# Patient Record
Sex: FEMALE | Race: WHITE | NOT HISPANIC OR LATINO | URBAN - METROPOLITAN AREA
[De-identification: names, ages, dates, MRNs, and addresses within clinical notes are randomized per-mention and may not be internally consistent; named-entity substitution may affect disease eponyms.]

---

## 2017-02-20 ENCOUNTER — EMERGENCY (EMERGENCY)
Facility: HOSPITAL | Age: 57
LOS: 1 days | Discharge: HOME | End: 2017-02-20
Attending: EMERGENCY MEDICINE | Admitting: EMERGENCY MEDICINE
Payer: COMMERCIAL

## 2017-02-20 VITALS — DIASTOLIC BLOOD PRESSURE: 108 MMHG | SYSTOLIC BLOOD PRESSURE: 174 MMHG

## 2017-02-20 VITALS — HEIGHT: 65 IN | WEIGHT: 125 LBS

## 2017-02-20 DIAGNOSIS — Z98.89 OTHER SPECIFIED POSTPROCEDURAL STATES: Chronic | ICD-10-CM

## 2017-02-20 DIAGNOSIS — Z96.611 PRESENCE OF RIGHT ARTIFICIAL SHOULDER JOINT: Chronic | ICD-10-CM

## 2017-02-20 DIAGNOSIS — Z90.710 ACQUIRED ABSENCE OF BOTH CERVIX AND UTERUS: Chronic | ICD-10-CM

## 2017-02-20 DIAGNOSIS — Z95.1 PRESENCE OF AORTOCORONARY BYPASS GRAFT: Chronic | ICD-10-CM

## 2017-02-20 PROCEDURE — 29105 APPLICATION LONG ARM SPLINT: CPT

## 2017-02-20 PROCEDURE — 73080 X-RAY EXAM OF ELBOW: CPT | Mod: 26,RT

## 2017-02-20 PROCEDURE — 73060 X-RAY EXAM OF HUMERUS: CPT | Mod: 26,RT

## 2017-02-20 PROCEDURE — 73080 X-RAY EXAM OF ELBOW: CPT

## 2017-02-20 PROCEDURE — 29105 APPLICATION LONG ARM SPLINT: CPT | Mod: RT

## 2017-02-20 PROCEDURE — 99284 EMERGENCY DEPT VISIT MOD MDM: CPT | Mod: 25

## 2017-02-20 PROCEDURE — 99283 EMERGENCY DEPT VISIT LOW MDM: CPT | Mod: 25

## 2017-02-20 PROCEDURE — 73060 X-RAY EXAM OF HUMERUS: CPT

## 2017-02-20 NOTE — ED PROCEDURE NOTE - NS ED PERI NEURO NEG
Pre-application: Motor, sensory, and vascular responses intact in the injured extremity./Post-application: Motor, sensory, and vascular responses intact in the injured extremity.

## 2017-02-20 NOTE — ED PROCEDURE NOTE - NS ED PERI VASCULAR NEG
no swelling/fingers/toes warm to touch/capillary refill time < 2 seconds/no cyanosis of extremity/no paresthesia

## 2017-02-20 NOTE — ED STATDOCS - PROGRESS NOTE DETAILS
PA NOTE: Pt seen by intake physician and hpi/orders/plan reviewed. PT presenting to ED with complaints of right elbow pain x 1 week.  Patient states that she slipped on the ice, pt has been taking tylenol for pain - doesn't want anything else for pain at this time  PE: GEN: Awake, alert,  NAD,  EYES: PERRL CARDIAC: Reg rate and rhythm, S1,S2, RRR  RESP: No distress noted. Lungs CTA bilaterally no wheeze, ronchi, rales. ABD: soft,  non-tender, no guarding. . NEURO: AOx3, no focal deficits   Musculoskeletal: ecchymosis and tenderness over the proximal forearm, radial pulse intact, full ROM  PLAN: xray, sling and f/u with ortho pt noted to have high BP - currently asymptomatic - took her lopressor today - advised to f/u with PMD/cardiologist PA NOTE: Pt seen by intake physician and hpi/orders/plan reviewed. PT presenting to ED with complaints of right elbow pain x 1 week.  Patient states that she slipped on the ice, pt has been taking tylenol for pain - doesn't want anything else for pain at this time  PE: GEN: Awake, alert,  NAD,  EYES: PERRL CARDIAC: Reg rate and rhythm, S1,S2, RRR  RESP: No distress noted. Lungs CTA bilaterally no wheeze, ronchi, rales. ABD: soft,  non-tender, no guarding. . NEURO: AOx3, no focal deficits   Musculoskeletal: ecchymosis and tenderness over the proximal forearm, radial pulse intact, full ROM  PLAN: xray, splint and f/u with ortho

## 2017-02-20 NOTE — ED STATDOCS - PMH
Anemia    CAD (coronary artery disease)    Chronic back pain greater than 3 months duration    Depression    Heart attack    Herniated nucleus pulposus    High cholesterol    Hypertension    Hyperthyroidism    Meningitis    Meningitis    Migraine    Mitral regurgitation    Opiate abuse, continuous  4 years ago  Pulmonary hypertension    Tricuspid regurgitation

## 2017-02-20 NOTE — ED ADULT TRIAGE NOTE - CHIEF COMPLAINT QUOTE
Patient arrived to ED today with c/o right elbow pain.  Patient states she fell onto her arm at work 6 days ago.  Patient states she slipped on a patch of ice at her job while opening her car door and fell onto her right arm.

## 2017-02-20 NOTE — ED STATDOCS - CARE PLAN
Principal Discharge DX:	Pain of right upper extremity Principal Discharge DX:	Closed fracture of elbow, right, initial encounter

## 2017-02-20 NOTE — ED STATDOCS - NS ED MD SCRIBE ATTENDING SCRIBE SECTIONS
PHYSICAL EXAM/DISPOSITION/VITAL SIGNS( Pullset)/INTAKE ASSESSMENT/SCREENINGS/PAST MEDICAL/SURGICAL/SOCIAL HISTORY/HISTORY OF PRESENT ILLNESS/REVIEW OF SYSTEMS/HIV

## 2017-02-20 NOTE — ED STATDOCS - ATTENDING CONTRIBUTION TO CARE
I, Ross Stoll, performed the initial face to face bedside interview with this patient regarding history of present illness, review of symptoms and relevant past medical, social and family history.  I completed an independent physical examination.  I was the provider who initially evaluated this patient.  The history, relevant review of systems, past medical and surgical history, medical decision making, and physical examination was documented by the scribe in my presence and I attest to the accuracy of the documentation. Follow-up on ordered tests (ie labs, radiologic studies) and re-evaluation of the patient's status has been communicated to the ACP.  Disposition of the patient will be based on test outcome and response to ED interventions.

## 2017-02-20 NOTE — ED STATDOCS - PSH
History of cholecystectomy    S/P appendectomy    S/P CABG (coronary artery bypass graft)  2012  S/P  section    S/P hysterectomy    S/P shoulder replacement, right

## 2017-02-20 NOTE — ED STATDOCS - OBJECTIVE STATEMENT
56 year old female, with hx of MI and CAD, presenting to the ED with right elbow pain s/p slip and fall on ice 6 days. Pt states that she was getting her apron out of her car while at work when she slipped on ice and landed on her right elbow. Pt states that she is unable to fully range her right upper extremity and is unable to push up on her right upper extremity. She states that she has PSHx of right shoulder replacement in October 2016. Pt states that she is taking Tylenol for her pain. No further complaints at this time.  PMD: Dr. Lombardi  Orthopedist: Dr. Nieves

## 2017-02-20 NOTE — ED STATDOCS - MUSCULOSKELETAL, MLM
Tenderness to supracondylar area. No scapular tenderness and no clavicular tenderness. Range of motion from 10 degrees to 95 degrees. Able to supinate and pronate right arm.

## 2017-02-20 NOTE — ED ADULT NURSE NOTE - OBJECTIVE STATEMENT
Patient arrived to ED today with c/o right elbow pain.  Patient states she fell onto her arm at work 6 days ago.  Patient states she slipped on a patch of ice at her job while opening her car door and fell onto her right arm..

## 2018-05-10 ENCOUNTER — MEDICATION RENEWAL (OUTPATIENT)
Age: 58
End: 2018-05-10

## 2018-06-05 ENCOUNTER — MEDICATION RENEWAL (OUTPATIENT)
Age: 58
End: 2018-06-05

## 2018-09-02 ENCOUNTER — EMERGENCY (EMERGENCY)
Facility: HOSPITAL | Age: 58
LOS: 1 days | Discharge: HOME | End: 2018-09-02
Attending: EMERGENCY MEDICINE
Payer: COMMERCIAL

## 2018-09-02 VITALS
HEIGHT: 65 IN | HEART RATE: 80 BPM | WEIGHT: 126.1 LBS | DIASTOLIC BLOOD PRESSURE: 86 MMHG | OXYGEN SATURATION: 100 % | RESPIRATION RATE: 18 BRPM | TEMPERATURE: 98 F | SYSTOLIC BLOOD PRESSURE: 136 MMHG

## 2018-09-02 DIAGNOSIS — Z96.611 PRESENCE OF RIGHT ARTIFICIAL SHOULDER JOINT: Chronic | ICD-10-CM

## 2018-09-02 DIAGNOSIS — Z90.710 ACQUIRED ABSENCE OF BOTH CERVIX AND UTERUS: Chronic | ICD-10-CM

## 2018-09-02 DIAGNOSIS — Z98.89 OTHER SPECIFIED POSTPROCEDURAL STATES: Chronic | ICD-10-CM

## 2018-09-02 DIAGNOSIS — Z95.1 PRESENCE OF AORTOCORONARY BYPASS GRAFT: Chronic | ICD-10-CM

## 2018-09-02 PROCEDURE — 99283 EMERGENCY DEPT VISIT LOW MDM: CPT

## 2018-09-02 RX ORDER — METHOCARBAMOL 500 MG/1
2 TABLET, FILM COATED ORAL
Qty: 30 | Refills: 0
Start: 2018-09-02 | End: 2018-09-06

## 2018-09-02 RX ORDER — METHOCARBAMOL 500 MG/1
1500 TABLET, FILM COATED ORAL ONCE
Qty: 0 | Refills: 0 | Status: COMPLETED | OUTPATIENT
Start: 2018-09-02 | End: 2018-09-02

## 2018-09-02 RX ADMIN — METHOCARBAMOL 1500 MILLIGRAM(S): 500 TABLET, FILM COATED ORAL at 15:42

## 2018-09-02 NOTE — ED PROVIDER NOTE - OBJECTIVE STATEMENT
59 yo F presented to ED with c/o left sided neck pain s/p injury 10 days ago. pt states that she walked into a coworkers shoulder accidently 10 days ago and has some facial pain, but resolved, however, pt now with left sided neck pain x couple days. pt taking Motrin and heat with some mild relief of pain. NO weakness or paresthesias. No HA or midline neck pain.

## 2018-09-02 NOTE — ED ADULT NURSE NOTE - NSIMPLEMENTINTERV_GEN_ALL_ED
Implemented All Universal Safety Interventions:  [place] to call system. Call bell, personal items and telephone within reach. Instruct patient to call for assistance. Room bathroom lighting operational. Non-slip footwear when patient is off stretcher. Physically safe environment: no spills, clutter or unnecessary equipment. Stretcher in lowest position, wheels locked, appropriate side rails in place.

## 2018-09-02 NOTE — ED PROVIDER NOTE - ATTENDING CONTRIBUTION TO CARE
I, Rojelio Colon, performed a face to face bedside interview with this patient regarding history of present illness, review of symptoms and relevant past medical, social and family history.  I completed an independent physical examination. I have communicated the patient’s plan of care and disposition with the ACP.  58 year old female presents with left sided neck pain x 10 days after wok place injury. No numbness, tingling, weakness, HA  Gen: NAD, well appearing  CV: RRR  Pul: CTA b/l  Neuro: no focal deficits  msk: no midline spinal pain, paraspinal tenderness  Sx consistwent with neck muscle spasm, neuro intact, feels better, stable for dc

## 2018-09-02 NOTE — ED ADULT NURSE NOTE - CHPI ED NUR SYMPTOMS NEG
no weakness/no paranoia/no hallucinations/no disorientation/no agitation/no change in level of consciousness/no weight loss/no fever/no suicidal

## 2018-09-04 ENCOUNTER — EMERGENCY (EMERGENCY)
Facility: HOSPITAL | Age: 58
LOS: 1 days | Discharge: HOME | End: 2018-09-04
Attending: EMERGENCY MEDICINE
Payer: COMMERCIAL

## 2018-09-04 VITALS
HEART RATE: 69 BPM | SYSTOLIC BLOOD PRESSURE: 132 MMHG | DIASTOLIC BLOOD PRESSURE: 80 MMHG | OXYGEN SATURATION: 99 % | RESPIRATION RATE: 17 BRPM | TEMPERATURE: 98 F

## 2018-09-04 VITALS — HEIGHT: 65 IN | WEIGHT: 126.1 LBS

## 2018-09-04 DIAGNOSIS — Z98.89 OTHER SPECIFIED POSTPROCEDURAL STATES: Chronic | ICD-10-CM

## 2018-09-04 DIAGNOSIS — Z95.1 PRESENCE OF AORTOCORONARY BYPASS GRAFT: Chronic | ICD-10-CM

## 2018-09-04 DIAGNOSIS — Z96.611 PRESENCE OF RIGHT ARTIFICIAL SHOULDER JOINT: Chronic | ICD-10-CM

## 2018-09-04 DIAGNOSIS — Z90.710 ACQUIRED ABSENCE OF BOTH CERVIX AND UTERUS: Chronic | ICD-10-CM

## 2018-09-04 LAB
ALBUMIN SERPL ELPH-MCNC: 4.1 G/DL — SIGNIFICANT CHANGE UP (ref 3.3–5.2)
ALP SERPL-CCNC: 118 U/L — SIGNIFICANT CHANGE UP (ref 40–120)
ALT FLD-CCNC: 74 U/L — HIGH
ANION GAP SERPL CALC-SCNC: 13 MMOL/L — SIGNIFICANT CHANGE UP (ref 5–17)
APTT BLD: 23.1 SEC — LOW (ref 27.5–37.4)
AST SERPL-CCNC: 37 U/L — HIGH
BASOPHILS # BLD AUTO: 0 K/UL — SIGNIFICANT CHANGE UP (ref 0–0.2)
BASOPHILS NFR BLD AUTO: 0.8 % — SIGNIFICANT CHANGE UP (ref 0–2)
BILIRUB SERPL-MCNC: <0.2 MG/DL — LOW (ref 0.4–2)
BUN SERPL-MCNC: 15 MG/DL — SIGNIFICANT CHANGE UP (ref 8–20)
CALCIUM SERPL-MCNC: 8.9 MG/DL — SIGNIFICANT CHANGE UP (ref 8.6–10.2)
CHLORIDE SERPL-SCNC: 103 MMOL/L — SIGNIFICANT CHANGE UP (ref 98–107)
CO2 SERPL-SCNC: 24 MMOL/L — SIGNIFICANT CHANGE UP (ref 22–29)
CREAT SERPL-MCNC: 0.54 MG/DL — SIGNIFICANT CHANGE UP (ref 0.5–1.3)
EOSINOPHIL # BLD AUTO: 0.2 K/UL — SIGNIFICANT CHANGE UP (ref 0–0.5)
EOSINOPHIL NFR BLD AUTO: 4.1 % — SIGNIFICANT CHANGE UP (ref 0–6)
GLUCOSE SERPL-MCNC: 90 MG/DL — SIGNIFICANT CHANGE UP (ref 70–115)
HCT VFR BLD CALC: 35.2 % — LOW (ref 37–47)
HGB BLD-MCNC: 11.3 G/DL — LOW (ref 12–16)
INR BLD: 0.91 RATIO — SIGNIFICANT CHANGE UP (ref 0.88–1.16)
LYMPHOCYTES # BLD AUTO: 1.4 K/UL — SIGNIFICANT CHANGE UP (ref 1–4.8)
LYMPHOCYTES # BLD AUTO: 27.4 % — SIGNIFICANT CHANGE UP (ref 20–55)
MCHC RBC-ENTMCNC: 29 PG — SIGNIFICANT CHANGE UP (ref 27–31)
MCHC RBC-ENTMCNC: 32.1 G/DL — SIGNIFICANT CHANGE UP (ref 32–36)
MCV RBC AUTO: 90.3 FL — SIGNIFICANT CHANGE UP (ref 81–99)
MONOCYTES # BLD AUTO: 0.4 K/UL — SIGNIFICANT CHANGE UP (ref 0–0.8)
MONOCYTES NFR BLD AUTO: 8 % — SIGNIFICANT CHANGE UP (ref 3–10)
NEUTROPHILS # BLD AUTO: 3.1 K/UL — SIGNIFICANT CHANGE UP (ref 1.8–8)
NEUTROPHILS NFR BLD AUTO: 59.7 % — SIGNIFICANT CHANGE UP (ref 37–73)
PLATELET # BLD AUTO: 233 K/UL — SIGNIFICANT CHANGE UP (ref 150–400)
POTASSIUM SERPL-MCNC: 4.1 MMOL/L — SIGNIFICANT CHANGE UP (ref 3.5–5.3)
POTASSIUM SERPL-SCNC: 4.1 MMOL/L — SIGNIFICANT CHANGE UP (ref 3.5–5.3)
PROT SERPL-MCNC: 6.3 G/DL — LOW (ref 6.6–8.7)
PROTHROM AB SERPL-ACNC: 10 SEC — SIGNIFICANT CHANGE UP (ref 9.8–12.7)
RBC # BLD: 3.9 M/UL — LOW (ref 4.4–5.2)
RBC # FLD: 13.1 % — SIGNIFICANT CHANGE UP (ref 11–15.6)
SODIUM SERPL-SCNC: 140 MMOL/L — SIGNIFICANT CHANGE UP (ref 135–145)
TROPONIN T SERPL-MCNC: <0.01 NG/ML — SIGNIFICANT CHANGE UP (ref 0–0.06)
TROPONIN T SERPL-MCNC: <0.01 NG/ML — SIGNIFICANT CHANGE UP (ref 0–0.06)
WBC # BLD: 5.2 K/UL — SIGNIFICANT CHANGE UP (ref 4.8–10.8)
WBC # FLD AUTO: 5.2 K/UL — SIGNIFICANT CHANGE UP (ref 4.8–10.8)

## 2018-09-04 PROCEDURE — 99284 EMERGENCY DEPT VISIT MOD MDM: CPT

## 2018-09-04 PROCEDURE — 80053 COMPREHEN METABOLIC PANEL: CPT

## 2018-09-04 PROCEDURE — 85730 THROMBOPLASTIN TIME PARTIAL: CPT

## 2018-09-04 PROCEDURE — 85027 COMPLETE CBC AUTOMATED: CPT

## 2018-09-04 PROCEDURE — 93005 ELECTROCARDIOGRAM TRACING: CPT

## 2018-09-04 PROCEDURE — 93010 ELECTROCARDIOGRAM REPORT: CPT

## 2018-09-04 PROCEDURE — 36415 COLL VENOUS BLD VENIPUNCTURE: CPT

## 2018-09-04 PROCEDURE — 71045 X-RAY EXAM CHEST 1 VIEW: CPT | Mod: 26

## 2018-09-04 PROCEDURE — 84484 ASSAY OF TROPONIN QUANT: CPT

## 2018-09-04 PROCEDURE — 82550 ASSAY OF CK (CPK): CPT

## 2018-09-04 PROCEDURE — 85610 PROTHROMBIN TIME: CPT

## 2018-09-04 PROCEDURE — 71045 X-RAY EXAM CHEST 1 VIEW: CPT

## 2018-09-04 PROCEDURE — 99283 EMERGENCY DEPT VISIT LOW MDM: CPT | Mod: 25

## 2018-09-04 RX ORDER — ACETAMINOPHEN 500 MG
650 TABLET ORAL ONCE
Qty: 0 | Refills: 0 | Status: COMPLETED | OUTPATIENT
Start: 2018-09-04 | End: 2018-09-04

## 2018-09-04 RX ADMIN — Medication 650 MILLIGRAM(S): at 15:48

## 2018-09-04 NOTE — ED PROVIDER NOTE - OBJECTIVE STATEMENT
57 yo female with PMH of CAD s/p CABG 5 years ago, HTN, Mitral and Tricuspid regurg presents after a syncopal episode. Patient was at work as a  at [employer], when she suddenly felt faint and passed out. She was caught by one of her coworkers and lowered to the floor. She states she was out for about 5 seconds and after felt clammy, sweaty, nauseous, had a HA and her heart was beating very fast with palpitations. She thought she might have had a heart attack so she took a baby aspirin. She states this has never happened to her before.  Patient states her son passed away 8 years ago to this day.  She currently complains of a 5/10, global, achy headache. She denies any dizziness, lightheadedness, CP, Abd pain, N/V/D, fever, recent illnesses or sick contacts.

## 2018-09-04 NOTE — ED ADULT NURSE NOTE - OBJECTIVE STATEMENT
Pt reports having syncopal episode at work. Pt did not hit her head, a coworker lowered her to the floor. Pt states that when she woke up she had chest pressure, pain and was sweating. Those symptoms have since resolved. Pt states that she had an MI in the past and that her symptoms were similar.

## 2018-09-04 NOTE — ED ADULT NURSE REASSESSMENT NOTE - NS ED NURSE REASSESS COMMENT FT1
Pt with improvement in symptoms. Pt reports a lot of stressors in her life during this time of the year. Will follow up with cardiology as directed.

## 2018-09-04 NOTE — ED STATDOCS - PROGRESS NOTE DETAILS
57 y/o F pt with hx fo CAD, HTN, mitral and tricuspid regurgitation s/p CABG, presents to ED c/o lightheadedness and syncope today. Pt states she was standing at register at work when she felt a head rush. She also felt palpitations and left arm pain. Former smoker. Pt took aspirin and came to ED. Pt will be placed in the main ED for complete evaluation by another provider.

## 2018-09-04 NOTE — ED PROVIDER NOTE - MEDICAL DECISION MAKING DETAILS
Syncope with possible Syncope of unknown etiology, possible psychosocial component due to anniversary of her son's passing. Will evaluate for metabolic and cardiac issues and monitor.

## 2018-09-04 NOTE — ED ADULT TRIAGE NOTE - CHIEF COMPLAINT QUOTE
'I fainted a little while ago and when I woke up my heart was racing. I had open heart surgery and I am afraid I might have a heart attack" Pt A & OX4. respirations even & unlabored. Ricco is cardiologist.

## 2018-09-04 NOTE — ED ADULT NURSE NOTE - NSIMPLEMENTINTERV_GEN_ALL_ED
Implemented All Universal Safety Interventions:  [employer] to call system. Call bell, personal items and telephone within reach. Instruct patient to call for assistance. Room bathroom lighting operational. Non-slip footwear when patient is off stretcher. Physically safe environment: no spills, clutter or unnecessary equipment. Stretcher in lowest position, wheels locked, appropriate side rails in place.

## 2018-09-04 NOTE — ED PROVIDER NOTE - ATTENDING CONTRIBUTION TO CARE
SEEN WITH RESIDENT DR MILLS  SYNCOPE AT WORK FOLLOWED BY SOB CHEST PAIN AND PALPITATIONS. IMPROVED NOW. FIRST EKG AND TROP NORMAL  DISCUSSED WITH DR ANTHONY WHO REC TWO SETS AND DISPO  AGREE WITH HX PE TX DISPO

## 2018-09-27 ENCOUNTER — APPOINTMENT (OUTPATIENT)
Dept: CARDIOLOGY | Facility: CLINIC | Age: 58
End: 2018-09-27
Payer: COMMERCIAL

## 2018-09-27 PROCEDURE — 78452 HT MUSCLE IMAGE SPECT MULT: CPT

## 2018-09-27 PROCEDURE — 93015 CV STRESS TEST SUPVJ I&R: CPT

## 2018-09-27 PROCEDURE — A9500: CPT

## 2018-10-01 RX ORDER — KIT FOR THE PREPARATION OF TECHNETIUM TC99M SESTAMIBI 1 MG/5ML
INJECTION, POWDER, LYOPHILIZED, FOR SOLUTION PARENTERAL
Refills: 0 | Status: COMPLETED | OUTPATIENT
Start: 2018-10-01

## 2018-10-01 RX ADMIN — KIT FOR THE PREPARATION OF TECHNETIUM TC99M SESTAMIBI 0: 1 INJECTION, POWDER, LYOPHILIZED, FOR SOLUTION PARENTERAL at 00:00

## 2019-04-24 ENCOUNTER — APPOINTMENT (OUTPATIENT)
Dept: FAMILY MEDICINE | Facility: CLINIC | Age: 59
End: 2019-04-24
Payer: COMMERCIAL

## 2019-04-24 VITALS
WEIGHT: 126 LBS | TEMPERATURE: 98.8 F | DIASTOLIC BLOOD PRESSURE: 80 MMHG | HEART RATE: 81 BPM | BODY MASS INDEX: 20.99 KG/M2 | HEIGHT: 65 IN | SYSTOLIC BLOOD PRESSURE: 139 MMHG | OXYGEN SATURATION: 96 %

## 2019-04-24 PROCEDURE — 99214 OFFICE O/P EST MOD 30 MIN: CPT

## 2019-04-24 NOTE — HISTORY OF PRESENT ILLNESS
[FreeTextEntry8] : Patient c/o cough and sore throat x 2 days  She has a lot of sinus congestion and a sinus headache. No relief w/OTC medications\par Pt also c/o pain in her right foot. Pain is intermittent and resolves on it's own. Stands on her feet for long periods of time at work.

## 2019-04-24 NOTE — PHYSICAL EXAM
[No Acute Distress] : no acute distress [Well Nourished] : well nourished [Well Developed] : well developed [Normal Sclera/Conjunctiva] : normal sclera/conjunctiva [Normal Outer Ear/Nose] : the outer ears and nose were normal in appearance [PERRL] : pupils equal round and reactive to light [Normal Oropharynx] : the oropharynx was normal [Supple] : supple [No Respiratory Distress] : no respiratory distress  [Clear to Auscultation] : lungs were clear to auscultation bilaterally [Normal Rate] : normal rate  [Regular Rhythm] : with a regular rhythm [Normal S1, S2] : normal S1 and S2 [Grossly Normal Strength/Tone] : grossly normal strength/tone [Normal Gait] : normal gait [Coordination Grossly Intact] : coordination grossly intact [Normal Insight/Judgement] : insight and judgment were intact [Normal Affect] : the affect was normal

## 2019-04-24 NOTE — PLAN
[FreeTextEntry1] : Advised to continue medications as directed. Life style and diet modifications were reviewed.\par Patient will begin antibiotic therapy as discussed. Supportive care was reviewed.\par Patient will try Tylenol for foot pain

## 2019-04-24 NOTE — REVIEW OF SYSTEMS
[Fatigue] : fatigue [Nasal Discharge] : nasal discharge [Postnasal Drip] : postnasal drip [Sore Throat] : sore throat [Cough] : cough [Negative] : Psychiatric

## 2019-04-24 NOTE — COUNSELING
[Healthy eating counseling provided] : healthy eating [Weight management counseling provided] : Weight management [Behavioral health counseling provided] : behavioral health

## 2019-04-26 ENCOUNTER — OTHER (OUTPATIENT)
Age: 59
End: 2019-04-26

## 2019-04-30 ENCOUNTER — APPOINTMENT (OUTPATIENT)
Dept: FAMILY MEDICINE | Facility: CLINIC | Age: 59
End: 2019-04-30
Payer: COMMERCIAL

## 2019-04-30 VITALS
BODY MASS INDEX: 19.77 KG/M2 | RESPIRATION RATE: 24 BRPM | TEMPERATURE: 98.2 F | DIASTOLIC BLOOD PRESSURE: 100 MMHG | HEART RATE: 83 BPM | HEIGHT: 66 IN | OXYGEN SATURATION: 99 % | WEIGHT: 123 LBS | SYSTOLIC BLOOD PRESSURE: 148 MMHG

## 2019-04-30 DIAGNOSIS — M85.80 OTHER SPECIFIED DISORDERS OF BONE DENSITY AND STRUCTURE, UNSPECIFIED SITE: ICD-10-CM

## 2019-04-30 DIAGNOSIS — Z87.898 PERSONAL HISTORY OF OTHER SPECIFIED CONDITIONS: ICD-10-CM

## 2019-04-30 PROCEDURE — 99214 OFFICE O/P EST MOD 30 MIN: CPT

## 2019-04-30 RX ORDER — ATORVASTATIN CALCIUM 10 MG/1
10 TABLET, FILM COATED ORAL DAILY
Qty: 90 | Refills: 1 | Status: DISCONTINUED | COMMUNITY
Start: 2018-06-05 | End: 2019-04-30

## 2019-04-30 NOTE — PLAN
[FreeTextEntry1] : Advised to continue medications as directed. Life style and diet modifications were discussed.\par Patient given requisition to get CXR.

## 2019-04-30 NOTE — HISTORY OF PRESENT ILLNESS
[FreeTextEntry8] : Patient complains dry cough is persistent . Wheezing noted, no fever since 04/27/2019. Patient states it is hard to take full breaths without coughing. Fatigue,myalgia and head ache. Diet is good and drinking water daily. Compliant with medications

## 2019-04-30 NOTE — PHYSICAL EXAM
[No Acute Distress] : no acute distress [Normal Sclera/Conjunctiva] : normal sclera/conjunctiva [Normal Outer Ear/Nose] : the outer ears and nose were normal in appearance [Supple] : supple [No Respiratory Distress] : no respiratory distress  [Normal Rate] : normal rate  [Normal Anterior Cervical Nodes] : no anterior cervical lymphadenopathy [Grossly Normal Strength/Tone] : grossly normal strength/tone [Normal Gait] : normal gait [Coordination Grossly Intact] : coordination grossly intact [Normal Affect] : the affect was normal [Normal Insight/Judgement] : insight and judgment were intact [de-identified] : Bilateral wheeze

## 2019-05-03 ENCOUNTER — APPOINTMENT (OUTPATIENT)
Dept: FAMILY MEDICINE | Facility: CLINIC | Age: 59
End: 2019-05-03
Payer: COMMERCIAL

## 2019-05-03 VITALS
HEART RATE: 87 BPM | BODY MASS INDEX: 19.29 KG/M2 | DIASTOLIC BLOOD PRESSURE: 96 MMHG | TEMPERATURE: 98.7 F | OXYGEN SATURATION: 99 % | WEIGHT: 120 LBS | HEIGHT: 66 IN | SYSTOLIC BLOOD PRESSURE: 142 MMHG

## 2019-05-03 DIAGNOSIS — Z83.79 FAMILY HISTORY OF OTHER DISEASES OF THE DIGESTIVE SYSTEM: ICD-10-CM

## 2019-05-03 DIAGNOSIS — Z87.09 PERSONAL HISTORY OF OTHER DISEASES OF THE RESPIRATORY SYSTEM: ICD-10-CM

## 2019-05-03 DIAGNOSIS — Z83.438 FAMILY HISTORY OF OTHER DISORDER OF LIPOPROTEIN METABOLISM AND OTHER LIPIDEMIA: ICD-10-CM

## 2019-05-03 DIAGNOSIS — Z80.1 FAMILY HISTORY OF MALIGNANT NEOPLASM OF TRACHEA, BRONCHUS AND LUNG: ICD-10-CM

## 2019-05-03 PROCEDURE — 99214 OFFICE O/P EST MOD 30 MIN: CPT

## 2019-05-03 NOTE — PHYSICAL EXAM
[Well Nourished] : well nourished [No Acute Distress] : no acute distress [Normal Sclera/Conjunctiva] : normal sclera/conjunctiva [PERRL] : pupils equal round and reactive to light [Normal Oropharynx] : the oropharynx was normal [Normal Outer Ear/Nose] : the outer ears and nose were normal in appearance [Supple] : supple [Normal Rate] : normal rate  [Clear to Auscultation] : lungs were clear to auscultation bilaterally [Regular Rhythm] : with a regular rhythm [Normal Anterior Cervical Nodes] : no anterior cervical lymphadenopathy [Normal S1, S2] : normal S1 and S2 [Grossly Normal Strength/Tone] : grossly normal strength/tone [Normal Gait] : normal gait [Coordination Grossly Intact] : coordination grossly intact [Normal Affect] : the affect was normal [Normal Insight/Judgement] : insight and judgment were intact [de-identified] : Bilateral rales

## 2019-05-03 NOTE — COUNSELING
[Weight management counseling provided] : Weight management [Healthy eating counseling provided] : healthy eating [Behavioral health counseling provided] : behavioral health  [Activity counseling provided] : activity [None] : None

## 2019-05-03 NOTE — HISTORY OF PRESENT ILLNESS
[FreeTextEntry1] : Pt here to follow up pneumonia. Pt states she feels some improvement but feels sluggish. Still has cough but is sleeping better.\par Diet is good and drinking water daily. Compliant w/medication

## 2019-05-03 NOTE — REVIEW OF SYSTEMS
[Fever] : fever [Fatigue] : fatigue [Postnasal Drip] : postnasal drip [Nasal Discharge] : nasal discharge [Cough] : cough [Muscle Pain] : muscle pain [Negative] : Heme/Lymph

## 2019-05-10 ENCOUNTER — APPOINTMENT (OUTPATIENT)
Dept: FAMILY MEDICINE | Facility: CLINIC | Age: 59
End: 2019-05-10
Payer: COMMERCIAL

## 2019-05-10 ENCOUNTER — NON-APPOINTMENT (OUTPATIENT)
Age: 59
End: 2019-05-10

## 2019-05-10 VITALS
WEIGHT: 123 LBS | SYSTOLIC BLOOD PRESSURE: 144 MMHG | BODY MASS INDEX: 19.77 KG/M2 | DIASTOLIC BLOOD PRESSURE: 88 MMHG | OXYGEN SATURATION: 97 % | HEIGHT: 66 IN | HEART RATE: 84 BPM

## 2019-05-10 DIAGNOSIS — Z87.09 PERSONAL HISTORY OF OTHER DISEASES OF THE RESPIRATORY SYSTEM: ICD-10-CM

## 2019-05-10 DIAGNOSIS — Z13.220 ENCOUNTER FOR SCREENING FOR LIPOID DISORDERS: ICD-10-CM

## 2019-05-10 DIAGNOSIS — J06.9 ACUTE UPPER RESPIRATORY INFECTION, UNSPECIFIED: ICD-10-CM

## 2019-05-10 DIAGNOSIS — R50.9 FEVER, UNSPECIFIED: ICD-10-CM

## 2019-05-10 PROCEDURE — 36415 COLL VENOUS BLD VENIPUNCTURE: CPT

## 2019-05-10 PROCEDURE — 93000 ELECTROCARDIOGRAM COMPLETE: CPT

## 2019-05-10 PROCEDURE — 99214 OFFICE O/P EST MOD 30 MIN: CPT | Mod: 25

## 2019-05-10 RX ORDER — BENZONATATE 100 MG/1
100 CAPSULE ORAL 3 TIMES DAILY
Qty: 30 | Refills: 0 | Status: COMPLETED | COMMUNITY
Start: 2019-04-26 | End: 2019-05-10

## 2019-05-10 RX ORDER — AMOXICILLIN AND CLAVULANATE POTASSIUM 875; 125 MG/1; MG/1
875-125 TABLET, COATED ORAL
Qty: 20 | Refills: 0 | Status: DISCONTINUED | COMMUNITY
Start: 2019-04-24 | End: 2019-05-10

## 2019-05-10 NOTE — PHYSICAL EXAM
[Well Developed] : well developed [Well Nourished] : well nourished [No Acute Distress] : no acute distress [PERRL] : pupils equal round and reactive to light [Normal Sclera/Conjunctiva] : normal sclera/conjunctiva [EOMI] : extraocular movements intact [Normal Oropharynx] : the oropharynx was normal [Normal Outer Ear/Nose] : the outer ears and nose were normal in appearance [Clear to Auscultation] : lungs were clear to auscultation bilaterally [No Respiratory Distress] : no respiratory distress  [Supple] : supple [Normal Rate] : normal rate  [Regular Rhythm] : with a regular rhythm [Normal Anterior Cervical Nodes] : no anterior cervical lymphadenopathy [Normal S1, S2] : normal S1 and S2 [Normal Gait] : normal gait [Grossly Normal Strength/Tone] : grossly normal strength/tone [Coordination Grossly Intact] : coordination grossly intact [Normal Insight/Judgement] : insight and judgment were intact

## 2019-05-10 NOTE — PLAN
[FreeTextEntry1] : Advised to restart Metoprolol. Will follow up w/cardiologist. Stated she had a stress test w/no pertinent findings

## 2019-05-10 NOTE — HISTORY OF PRESENT ILLNESS
[FreeTextEntry1] : Pt here for follow up pneumonia. Pt still coughing but cough is getting better. Pt needs Metoprolol renewal she has been out for 1-2 months. She is under care of cardiologist and needs to finish evaluation and Echo.\par \par  [de-identified] : Diet is good and drinking water daily.

## 2019-07-29 ENCOUNTER — CLINICAL ADVICE (OUTPATIENT)
Age: 59
End: 2019-07-29

## 2019-07-29 DIAGNOSIS — E55.9 VITAMIN D DEFICIENCY, UNSPECIFIED: ICD-10-CM

## 2019-08-02 ENCOUNTER — RESULT REVIEW (OUTPATIENT)
Age: 59
End: 2019-08-02

## 2019-08-06 ENCOUNTER — APPOINTMENT (OUTPATIENT)
Dept: FAMILY MEDICINE | Facility: CLINIC | Age: 59
End: 2019-08-06

## 2019-08-15 ENCOUNTER — APPOINTMENT (OUTPATIENT)
Dept: FAMILY MEDICINE | Facility: CLINIC | Age: 59
End: 2019-08-15
Payer: COMMERCIAL

## 2019-08-15 ENCOUNTER — NON-APPOINTMENT (OUTPATIENT)
Age: 59
End: 2019-08-15

## 2019-08-15 VITALS
DIASTOLIC BLOOD PRESSURE: 64 MMHG | HEIGHT: 66 IN | SYSTOLIC BLOOD PRESSURE: 92 MMHG | BODY MASS INDEX: 21.38 KG/M2 | TEMPERATURE: 98.8 F | OXYGEN SATURATION: 97 % | HEART RATE: 61 BPM | RESPIRATION RATE: 20 BRPM | WEIGHT: 133 LBS

## 2019-08-15 DIAGNOSIS — G62.9 POLYNEUROPATHY, UNSPECIFIED: ICD-10-CM

## 2019-08-15 DIAGNOSIS — Z87.01 PERSONAL HISTORY OF PNEUMONIA (RECURRENT): ICD-10-CM

## 2019-08-15 DIAGNOSIS — R60.0 LOCALIZED EDEMA: ICD-10-CM

## 2019-08-15 DIAGNOSIS — R05 COUGH: ICD-10-CM

## 2019-08-15 PROCEDURE — 96372 THER/PROPH/DIAG INJ SC/IM: CPT

## 2019-08-15 PROCEDURE — 99214 OFFICE O/P EST MOD 30 MIN: CPT | Mod: 25

## 2019-08-15 RX ORDER — CYANOCOBALAMIN 1000 UG/ML
1000 INJECTION INTRAMUSCULAR; SUBCUTANEOUS
Qty: 0 | Refills: 0 | Status: COMPLETED | OUTPATIENT
Start: 2019-08-15

## 2019-08-15 RX ORDER — CIPROFLOXACIN HYDROCHLORIDE 500 MG/1
500 TABLET, FILM COATED ORAL
Qty: 14 | Refills: 0 | Status: COMPLETED | COMMUNITY
Start: 2019-05-03 | End: 2019-08-15

## 2019-08-15 RX ADMIN — CYANOCOBALAMIN 1 MCG/ML: 1000 INJECTION INTRAMUSCULAR; SUBCUTANEOUS at 00:00

## 2019-08-15 NOTE — COUNSELING
[Behavioral health counseling provided] : Behavioral health counseling provided [Sleep ___ hours/day] : Sleep [unfilled] hours/day [Engage in a relaxing activity] : Engage in a relaxing activity [AUDIT-C Screening administered and reviewed] : AUDIT-C Screening administered and reviewed

## 2019-08-15 NOTE — PHYSICAL EXAM
[No Acute Distress] : no acute distress [Well Nourished] : well nourished [Well Developed] : well developed [Normal Sclera/Conjunctiva] : normal sclera/conjunctiva [PERRL] : pupils equal round and reactive to light [Normal Oropharynx] : the oropharynx was normal [Normal Outer Ear/Nose] : the outer ears and nose were normal in appearance [No Respiratory Distress] : no respiratory distress  [Normal Rate] : normal rate  [Clear to Auscultation] : lungs were clear to auscultation bilaterally [Regular Rhythm] : with a regular rhythm [Normal Anterior Cervical Nodes] : no anterior cervical lymphadenopathy [Normal S1, S2] : normal S1 and S2 [Grossly Normal Strength/Tone] : grossly normal strength/tone [Coordination Grossly Intact] : coordination grossly intact [Normal Insight/Judgement] : insight and judgment were intact [Normal Affect] : the affect was normal [Normal Gait] : normal gait

## 2019-08-16 NOTE — HEALTH RISK ASSESSMENT
[] : No [No] : In the past 12 months have you used drugs other than those required for medical reasons? No [No falls in past year] : Patient reported no falls in the past year

## 2019-08-16 NOTE — HISTORY OF PRESENT ILLNESS
[FreeTextEntry1] : Patient is here to review [facility] labs from 08/07/2019. Patient complains of a sensation of heaviness in both arms and upper body x one month intermittent x 4-5 times. Bilateral lower extremity edema left worse than right.Started more than a week ago. Denies recent travel,change in diet . Only eats meals made from home. [de-identified] : Has Cardiologist appointment on 9/10/19. Has not finished Cardiac evaluation.

## 2019-08-16 NOTE — PLAN
[FreeTextEntry1] : Advised to continue medication as directed. Life style and diet modifications were discussed.\par Requisition for carotid Doppler and Echo were provided to the patient

## 2019-09-10 ENCOUNTER — NON-APPOINTMENT (OUTPATIENT)
Age: 59
End: 2019-09-10

## 2019-09-10 ENCOUNTER — APPOINTMENT (OUTPATIENT)
Dept: CARDIOLOGY | Facility: CLINIC | Age: 59
End: 2019-09-10
Payer: COMMERCIAL

## 2019-09-10 VITALS
OXYGEN SATURATION: 100 % | DIASTOLIC BLOOD PRESSURE: 82 MMHG | HEART RATE: 62 BPM | SYSTOLIC BLOOD PRESSURE: 137 MMHG | HEIGHT: 66 IN | BODY MASS INDEX: 21.86 KG/M2 | WEIGHT: 136 LBS | RESPIRATION RATE: 15 BRPM

## 2019-09-10 PROCEDURE — 99214 OFFICE O/P EST MOD 30 MIN: CPT

## 2019-09-10 PROCEDURE — 93000 ELECTROCARDIOGRAM COMPLETE: CPT

## 2019-09-10 NOTE — DISCUSSION/SUMMARY
[FreeTextEntry1] : Patient is a 58yo F with CAD s/p CABG,. MR, TR here for cardiac follow up. Patient has been doing well without any chest pain or shortness of breath. ECG and exam unremarkable. BP borderline elevated, no med changes at this time. Needs statin. Also echo to eval MR/TR, had nuclear stress test last year without ischemia, no need to repeat at this time. \par \par 1. Echo to evaluate MR/TR\par 2. ASA 81mg daily instead of 325\par 3. Add statin given CAD and high TG, recheck lipids 3 months. Rosuvastatin 5mg qhs\par 4. Follow up 3 months\par 5. Re-eval BP with PMD and here at follow up, increase BP meds if needed then. \par 6. Recommend aggressive diet and lifestyle modifications \par 7. Recommend 30 minutes moderate intensity aerobic activity 5 days per week as tolerated

## 2019-10-07 ENCOUNTER — APPOINTMENT (OUTPATIENT)
Dept: CARDIOLOGY | Facility: CLINIC | Age: 59
End: 2019-10-07
Payer: COMMERCIAL

## 2019-10-07 PROCEDURE — 93306 TTE W/DOPPLER COMPLETE: CPT

## 2019-10-08 ENCOUNTER — EMERGENCY (EMERGENCY)
Facility: HOSPITAL | Age: 59
LOS: 1 days | Discharge: HOME | End: 2019-10-08
Attending: EMERGENCY MEDICINE
Payer: COMMERCIAL

## 2019-10-08 VITALS
SYSTOLIC BLOOD PRESSURE: 124 MMHG | DIASTOLIC BLOOD PRESSURE: 79 MMHG | RESPIRATION RATE: 18 BRPM | TEMPERATURE: 98 F | OXYGEN SATURATION: 97 % | HEART RATE: 62 BPM

## 2019-10-08 DIAGNOSIS — Z98.89 OTHER SPECIFIED POSTPROCEDURAL STATES: Chronic | ICD-10-CM

## 2019-10-08 DIAGNOSIS — Z95.1 PRESENCE OF AORTOCORONARY BYPASS GRAFT: Chronic | ICD-10-CM

## 2019-10-08 DIAGNOSIS — Z90.710 ACQUIRED ABSENCE OF BOTH CERVIX AND UTERUS: Chronic | ICD-10-CM

## 2019-10-08 DIAGNOSIS — Z96.611 PRESENCE OF RIGHT ARTIFICIAL SHOULDER JOINT: Chronic | ICD-10-CM

## 2019-10-08 PROCEDURE — 73110 X-RAY EXAM OF WRIST: CPT

## 2019-10-08 PROCEDURE — 29125 APPL SHORT ARM SPLINT STATIC: CPT

## 2019-10-08 PROCEDURE — 73110 X-RAY EXAM OF WRIST: CPT | Mod: 26,LT

## 2019-10-08 PROCEDURE — 29125 APPL SHORT ARM SPLINT STATIC: CPT | Mod: LT

## 2019-10-08 PROCEDURE — 99283 EMERGENCY DEPT VISIT LOW MDM: CPT | Mod: 25

## 2019-10-08 PROCEDURE — 73030 X-RAY EXAM OF SHOULDER: CPT

## 2019-10-08 PROCEDURE — 73130 X-RAY EXAM OF HAND: CPT | Mod: 26,LT

## 2019-10-08 PROCEDURE — 73030 X-RAY EXAM OF SHOULDER: CPT | Mod: 26,LT

## 2019-10-08 PROCEDURE — 73130 X-RAY EXAM OF HAND: CPT

## 2019-10-08 PROCEDURE — 99284 EMERGENCY DEPT VISIT MOD MDM: CPT | Mod: 25

## 2019-10-08 NOTE — ED PROVIDER NOTE - ATTENDING CONTRIBUTION TO CARE
I, Nba Anderson, performed a face to face bedside interview with this patient regarding history of present illness, review of symptoms and relevant past medical, social and family history.  I completed an independent physical examination. I have communicated the patient’s plan of care and disposition with the ACP.      s/p fall off stool with left shoulder pain and left wrist pain;  denies any other trama;  pe head ncat neck nontender  chest nontender;left shoulder tender over ac;  pain with rom;  left wrist tender over radial and ular  mild swelling;  dx  shoulder injury; wrist injury

## 2019-10-08 NOTE — ED PROVIDER NOTE - PATIENT PORTAL LINK FT
You can access the FollowMyHealth Patient Portal offered by [facility] by registering at the following website: [email or web address]. By joining [facility]’s FollowMyHealth portal, you will also be able to view your health information using other applications (apps) compatible with our system.

## 2019-10-08 NOTE — ED PROVIDER NOTE - GASTROINTESTINAL NEGATIVE STATEMENT, MLM
weight-bearing as tolerated
no abdominal pain, no bloating, no constipation, no diarrhea, no nausea and no vomiting.

## 2019-10-08 NOTE — ED PROVIDER NOTE - PHYSICAL EXAMINATION
HEENT: atraumatic, no racoon eyes, no arceo sings, no hemotynpaum, PERRL, EOMI, no nystagmus, no dental injuries  Neck: supple, no midline tenderness to palpation, + FROM, NEXUS negative, no abrasions, no echymosis  Chest: non tender, equal expansion bilaterally, no echymosis, no abrasions,   Lungs: CTA, good air entry bilaterally, no wheezing, no rales, no rhonchi  Abdomen: soft, non tender, no guarding, no rebound, no distention, no echymosis  Back: no midline tenderness to palpation   Left shoulder: + TTP over left AC joint, no deformities, + FROM , NVI distally  Left wrist: + TTP over distal radius and ulna, no deformities, + mild snuffbox tenderness , + swelling noted to dorsum of hand.   Skin: no rash  Neuro: A & O x 3, clear speech, steady gait, cerrebellar intact, no focal deficits. HEENT: atraumatic, no racoon eyes, no arceo sings, no hemotynpanum, PERRL, EOMI, no nystagmus, no dental injuries  Neck: supple, no midline tenderness to palpation, + FROM, NEXUS negative, no abrasions, no echymosis  Chest: non tender, equal expansion bilaterally, no echymosis, no abrasions,   Lungs: CTA, good air entry bilaterally, no wheezing, no rales, no rhonchi  Abdomen: soft, non tender, no guarding, no rebound, no distention, no echymosis  Back: no midline tenderness to palpation   Left shoulder: + TTP over left AC joint, no deformities, + FROM , NVI distally  Left wrist: + TTP over distal radius and ulna, no deformities, + mild snuffbox tenderness , + swelling noted to dorsum of hand.   Skin: no rash  Neuro: A & O x 3, clear speech, steady gait, cerrebellar intact, no focal deficits.

## 2019-10-08 NOTE — ED PROVIDER NOTE - OBJECTIVE STATEMENT
60 y/o female presents c/o left shoulder and left wrist pain s/p fall from stool today while in her kitchen. Denies LOC, HA, dizziness, changes in vision, nausea, vomiting, neck pain, neck stiffness, parethesias, weakness in extremities, cp, sob, palpitations, abdominal pain, or pelvic pain. no blood thinners

## 2019-10-08 NOTE — ED PROVIDER NOTE - CARE PLAN
Principal Discharge DX:	Shoulder injury, initial encounter  Secondary Diagnosis:	Injury of wrist, unspecified laterality, initial encounter

## 2019-10-26 ENCOUNTER — APPOINTMENT (OUTPATIENT)
Dept: FAMILY MEDICINE | Facility: CLINIC | Age: 59
End: 2019-10-26
Payer: COMMERCIAL

## 2019-10-26 VITALS
HEART RATE: 77 BPM | DIASTOLIC BLOOD PRESSURE: 88 MMHG | SYSTOLIC BLOOD PRESSURE: 140 MMHG | BODY MASS INDEX: 22.18 KG/M2 | OXYGEN SATURATION: 98 % | TEMPERATURE: 98.3 F | WEIGHT: 138 LBS | HEIGHT: 66 IN

## 2019-10-26 DIAGNOSIS — R19.00 INTRA-ABDOMINAL AND PELVIC SWELLING, MASS AND LUMP, UNSPECIFIED SITE: ICD-10-CM

## 2019-10-26 PROCEDURE — 99214 OFFICE O/P EST MOD 30 MIN: CPT

## 2019-10-30 NOTE — PHYSICAL EXAM
[No Acute Distress] : no acute distress [Well Nourished] : well nourished [Well Developed] : well developed [Normal Sclera/Conjunctiva] : normal sclera/conjunctiva [PERRL] : pupils equal round and reactive to light [EOMI] : extraocular movements intact [Normal Outer Ear/Nose] : the outer ears and nose were normal in appearance [Normal Oropharynx] : the oropharynx was normal [No Lymphadenopathy] : no lymphadenopathy [Supple] : supple [No Respiratory Distress] : no respiratory distress  [Clear to Auscultation] : lungs were clear to auscultation bilaterally [Normal Rate] : normal rate  [Regular Rhythm] : with a regular rhythm [Normal S1, S2] : normal S1 and S2 [Normal Anterior Cervical Nodes] : no anterior cervical lymphadenopathy [Grossly Normal Strength/Tone] : grossly normal strength/tone [Coordination Grossly Intact] : coordination grossly intact [Normal Gait] : normal gait [Normal Affect] : the affect was normal [Normal Insight/Judgement] : insight and judgment were intact

## 2019-10-30 NOTE — PLAN
[FreeTextEntry1] : Advised to continue medications as directed. Life style and diet modifications were reviewed\par Requisition for abdominal ultrasound was provided to the patient\par Referral to Psychiatrist for evaluation of anxiety/depression medication

## 2019-10-30 NOTE — HISTORY OF PRESENT ILLNESS
[FreeTextEntry1] : Patient presents for follow up of health care. Would like to discuss Depression.\par Tomorrow is anniversary of her daughter's death\par Currently, taking Wellbutrin XL 300mg daily. Would like to add Seroquel.\par Recently, was the recipient of a wrongful firing from work. This has increased her anxiety [de-identified] : Patient is under care of therapist\par Pt had flu vaccine 1 month ago (9-11)\par Diet is good and drinking water daily. Compliant w/medications\par Also, has noticed an epigastric protrusion she has had for over 6 months. Denies pain or increase in size.\par Denies N/V/D or change in Bm's

## 2019-11-19 ENCOUNTER — APPOINTMENT (OUTPATIENT)
Dept: CARDIOLOGY | Facility: CLINIC | Age: 59
End: 2019-11-19

## 2019-12-09 ENCOUNTER — RX RENEWAL (OUTPATIENT)
Age: 59
End: 2019-12-09

## 2020-01-06 ENCOUNTER — APPOINTMENT (OUTPATIENT)
Dept: CARDIOLOGY | Facility: CLINIC | Age: 60
End: 2020-01-06

## 2020-02-06 ENCOUNTER — APPOINTMENT (OUTPATIENT)
Dept: FAMILY MEDICINE | Facility: CLINIC | Age: 60
End: 2020-02-06
Payer: MEDICAID

## 2020-02-06 VITALS
HEIGHT: 66 IN | BODY MASS INDEX: 21.86 KG/M2 | SYSTOLIC BLOOD PRESSURE: 132 MMHG | DIASTOLIC BLOOD PRESSURE: 80 MMHG | WEIGHT: 136 LBS | TEMPERATURE: 97.9 F | HEART RATE: 63 BPM | OXYGEN SATURATION: 96 %

## 2020-02-06 DIAGNOSIS — K57.32 DIVERTICULITIS OF LARGE INTESTINE W/OUT PERFORATION OR ABSCESS W/OUT BLEEDING: ICD-10-CM

## 2020-02-06 PROCEDURE — 99214 OFFICE O/P EST MOD 30 MIN: CPT

## 2020-02-06 RX ORDER — ESCITALOPRAM OXALATE 5 MG/1
5 TABLET ORAL DAILY
Qty: 30 | Refills: 0 | Status: DISCONTINUED | COMMUNITY
Start: 2019-10-26 | End: 2020-02-06

## 2020-02-06 RX ORDER — HYDROCHLOROTHIAZIDE 12.5 MG/1
12.5 TABLET ORAL
Qty: 30 | Refills: 0 | Status: DISCONTINUED | COMMUNITY
Start: 2019-08-15 | End: 2020-02-06

## 2020-02-06 RX ORDER — ERGOCALCIFEROL 1.25 MG/1
1.25 MG CAPSULE, LIQUID FILLED ORAL
Qty: 8 | Refills: 0 | Status: DISCONTINUED | COMMUNITY
Start: 2019-07-29 | End: 2020-02-06

## 2020-02-08 NOTE — PLAN
[FreeTextEntry1] : If SOB is not resolving with as needed Albuterol, patient to return for further eval. Follow up on labs.

## 2020-02-08 NOTE — HISTORY OF PRESENT ILLNESS
[FreeTextEntry1] : Pt wants a letter for psychiatrist to state that we will not prescribe gabapentin and Wellbutrin anymore [de-identified] : Ms. DUKE GARCIA is a 59 year female with pmh as below, presenting to the office today for letter. Patient also complaining of intermittent shortness of breath and chest tightness. She reports that when she had Albuterol inhaler, it helped her symptoms.

## 2020-02-08 NOTE — PHYSICAL EXAM
[No Acute Distress] : no acute distress [Well Nourished] : well nourished [Normal Sclera/Conjunctiva] : normal sclera/conjunctiva [Well Developed] : well developed [Well-Appearing] : well-appearing [EOMI] : extraocular movements intact [No Respiratory Distress] : no respiratory distress  [Supple] : supple [Normal Outer Ear/Nose] : the outer ears and nose were normal in appearance [No Accessory Muscle Use] : no accessory muscle use [Normal Rate] : normal rate  [Clear to Auscultation] : lungs were clear to auscultation bilaterally [Regular Rhythm] : with a regular rhythm [Normal S1, S2] : normal S1 and S2 [No Extremity Clubbing/Cyanosis] : no extremity clubbing/cyanosis [Soft] : abdomen soft [Non Tender] : non-tender [Non-distended] : non-distended [No Masses] : no abdominal mass palpated [No HSM] : no HSM [Normal Bowel Sounds] : normal bowel sounds [No Joint Swelling] : no joint swelling [Grossly Normal Strength/Tone] : grossly normal strength/tone [Coordination Grossly Intact] : coordination grossly intact [No Rash] : no rash [No Focal Deficits] : no focal deficits [Normal Gait] : normal gait [Normal Affect] : the affect was normal [Normal Insight/Judgement] : insight and judgment were intact

## 2020-03-05 ENCOUNTER — APPOINTMENT (OUTPATIENT)
Dept: FAMILY MEDICINE | Facility: CLINIC | Age: 60
End: 2020-03-05

## 2020-04-22 ENCOUNTER — TRANSCRIPTION ENCOUNTER (OUTPATIENT)
Age: 60
End: 2020-04-22

## 2020-04-23 ENCOUNTER — TRANSCRIPTION ENCOUNTER (OUTPATIENT)
Age: 60
End: 2020-04-23

## 2020-06-03 ENCOUNTER — RX RENEWAL (OUTPATIENT)
Age: 60
End: 2020-06-03

## 2020-08-12 ENCOUNTER — APPOINTMENT (OUTPATIENT)
Dept: FAMILY MEDICINE | Facility: CLINIC | Age: 60
End: 2020-08-12
Payer: MEDICAID

## 2020-08-12 DIAGNOSIS — K21.9 GASTRO-ESOPHAGEAL REFLUX DISEASE W/OUT ESOPHAGITIS: ICD-10-CM

## 2020-08-12 PROCEDURE — 99213 OFFICE O/P EST LOW 20 MIN: CPT | Mod: 95

## 2020-08-13 ENCOUNTER — RX CHANGE (OUTPATIENT)
Age: 60
End: 2020-08-13

## 2020-08-13 PROBLEM — K21.9 GASTROESOPHAGEAL REFLUX DISEASE, ESOPHAGITIS PRESENCE NOT SPECIFIED: Status: ACTIVE | Noted: 2020-08-13

## 2020-08-13 NOTE — PLAN
[FreeTextEntry1] : Suspect patient has GERD or gastric ulcer. Patient referred to GI. Patient states she will go to Dr. Senior

## 2020-08-13 NOTE — HISTORY OF PRESENT ILLNESS
[Home] : at home, [unfilled] , at the time of the visit. [Medical Office: ([place] [facility])___] : at the medical office located in  [Verbal consent obtained from patient] : the patient, [unfilled] [FreeTextEntry8] :  Ms. DUKE GARCIA is a 60 year  female with past medical hx as listed, in telehealth encounter c/o of stomach issues for the past 2 weeks. It started with a fever and diarrhea associated with abdominal pain. Patient changed her diet to bland and it seemed to work but now it seems everything she takes upsets her stomach and she a dull pain in the epigastric region. She is only able to tolerate small amounts of bland food.

## 2020-08-13 NOTE — PHYSICAL EXAM
[de-identified] : Telehealth precludes traditional, comprehensive physical exam. Patient appeared stable and alert.

## 2020-10-15 ENCOUNTER — APPOINTMENT (OUTPATIENT)
Dept: FAMILY MEDICINE | Facility: CLINIC | Age: 60
End: 2020-10-15

## 2020-11-23 ENCOUNTER — APPOINTMENT (OUTPATIENT)
Dept: CARDIOLOGY | Facility: CLINIC | Age: 60
End: 2020-11-23
Payer: MEDICAID

## 2020-11-23 ENCOUNTER — NON-APPOINTMENT (OUTPATIENT)
Age: 60
End: 2020-11-23

## 2020-11-23 VITALS
HEIGHT: 66 IN | TEMPERATURE: 98.1 F | DIASTOLIC BLOOD PRESSURE: 100 MMHG | SYSTOLIC BLOOD PRESSURE: 191 MMHG | HEART RATE: 72 BPM | BODY MASS INDEX: 21.05 KG/M2 | WEIGHT: 131 LBS | RESPIRATION RATE: 16 BRPM

## 2020-11-23 VITALS — SYSTOLIC BLOOD PRESSURE: 170 MMHG | DIASTOLIC BLOOD PRESSURE: 100 MMHG

## 2020-11-23 DIAGNOSIS — R03.0 ELEVATED BLOOD-PRESSURE READING, W/OUT DIAGNOSIS OF HYPERTENSION: ICD-10-CM

## 2020-11-23 DIAGNOSIS — I21.9 ACUTE MYOCARDIAL INFARCTION, UNSPECIFIED: ICD-10-CM

## 2020-11-23 PROCEDURE — 99214 OFFICE O/P EST MOD 30 MIN: CPT

## 2020-11-23 PROCEDURE — 93000 ELECTROCARDIOGRAM COMPLETE: CPT

## 2020-11-23 RX ORDER — ROSUVASTATIN CALCIUM 5 MG/1
5 TABLET, FILM COATED ORAL DAILY
Qty: 90 | Refills: 1 | Status: DISCONTINUED | COMMUNITY
Start: 2019-09-10 | End: 2020-11-23

## 2020-11-23 RX ORDER — PANTOPRAZOLE 40 MG/1
40 TABLET, DELAYED RELEASE ORAL
Qty: 90 | Refills: 0 | Status: DISCONTINUED | COMMUNITY
Start: 2020-08-13 | End: 2020-11-23

## 2020-11-23 NOTE — ASSESSMENT
[FreeTextEntry1] : ECG: SR, LAE, ASMI \par \par  HDL 66 LDL 85  (7/2019) \par \par ECHO 10/2019:\par 1. Normal LV size and function, EF 60%\par 2. Equivocal diastolic parameters\par 3. RVE with  normal function\par 4. Moderate LAE/CARMEN\par 5. Mild to moderate MR\par 6. Moderate to severe TR, RVSP 52mmHg\par 7. RVVO \par \par EX NUCLEAR STRESS TEST 9/2018:\par 1. Negative for ischemia, EF 81%\par 2. PVCs\par 3. Normal BP response

## 2020-11-23 NOTE — HISTORY OF PRESENT ILLNESS
[FreeTextEntry1] : Patient is a 61yo F with CAD s/p CABG,. MR, TR here for cardiac follow up.   Patient has been doing well without any chest pain or shortness of breath. Patient denies PND/orthopnea/palpitations/syncope/claudication.  Doing some light workouts and no complaints. FEels a little out of shape she states. Ran out of metoprolol a few weeks back. \par \par Looking to move down to [place] soon, delayed related to pandemic and may stay here now. STill finalizing divorce paperwork. Now celebrating 10 years sober this month. Living with cousin now, not working. \par \par ROS: GI and  negative

## 2020-11-23 NOTE — DISCUSSION/SUMMARY
[FreeTextEntry1] : Patient is a 59yo F with CAD s/p CABG, MR, TR here for cardiac follow up. Patient has been doing well without any chest pain or shortness of breath. ECG and exam unremarkable. \par \par 1. MILI to evaluate ischemia and MR/TR/PULM htn, full resting echo as well\par 2. Continue ASA 81mg daily \par 3. Continue medical management of CAD , continue statin \par 4. Restart Metoprolol, BP high today partly from rebound and white coat HTN. MAy need to add additional medications as well. \par 5. Follow up 6 weeks

## 2020-11-24 NOTE — ED PROVIDER NOTE - FAMILY HISTORY
Patient presented after waiting 30 minutes with no reaction to allergy injections. Discharged from clinic.    Lorna TORRES RN    
No pertinent family history in first degree relatives

## 2020-11-30 ENCOUNTER — APPOINTMENT (OUTPATIENT)
Dept: OBGYN | Facility: CLINIC | Age: 60
End: 2020-11-30

## 2020-12-29 ENCOUNTER — APPOINTMENT (OUTPATIENT)
Dept: CARDIOLOGY | Facility: CLINIC | Age: 60
End: 2020-12-29

## 2021-01-13 ENCOUNTER — APPOINTMENT (OUTPATIENT)
Dept: OBGYN | Facility: CLINIC | Age: 61
End: 2021-01-13
Payer: MEDICAID

## 2021-01-13 VITALS
SYSTOLIC BLOOD PRESSURE: 135 MMHG | DIASTOLIC BLOOD PRESSURE: 90 MMHG | WEIGHT: 137 LBS | BODY MASS INDEX: 22.02 KG/M2 | HEIGHT: 66 IN

## 2021-01-13 DIAGNOSIS — Z01.419 ENCOUNTER FOR GYNECOLOGICAL EXAMINATION (GENERAL) (ROUTINE) W/OUT ABNORMAL FINDINGS: ICD-10-CM

## 2021-01-13 DIAGNOSIS — Z78.0 ASYMPTOMATIC MENOPAUSAL STATE: ICD-10-CM

## 2021-01-13 DIAGNOSIS — Z87.42 PERSONAL HISTORY OF OTHER DISEASES OF THE FEMALE GENITAL TRACT: ICD-10-CM

## 2021-01-13 PROCEDURE — 99072 ADDL SUPL MATRL&STAF TM PHE: CPT

## 2021-01-13 PROCEDURE — 99386 PREV VISIT NEW AGE 40-64: CPT

## 2021-01-13 RX ORDER — ALBUTEROL SULFATE 90 UG/1
108 (90 BASE) AEROSOL, METERED RESPIRATORY (INHALATION)
Qty: 1 | Refills: 1 | Status: DISCONTINUED | COMMUNITY
Start: 2019-04-30 | End: 2021-01-13

## 2021-01-13 RX ORDER — BUPROPION HYDROCHLORIDE 300 MG/1
300 TABLET, EXTENDED RELEASE ORAL
Qty: 7 | Refills: 0 | Status: DISCONTINUED | COMMUNITY
Start: 2019-10-26 | End: 2021-01-13

## 2021-01-13 NOTE — HISTORY OF PRESENT ILLNESS
[N] : Patient is not sexually active [Y] : Positive pregnancy history [Menarche Age: ____] : age at menarche was [unfilled] [PGxTotal] : 6 [[facility]] : 3 [PGHxPremature] : 0 [PGHxAbortions] : 0 [[facility] Centeriving] : 1 [PGHxABInduced] : 2 [PGHxABSpont] : 1 [PGHxEctopic] : 0 [PGHxMultBirths] : 0 [Menopause Age: ____] : age at menopause was [unfilled]

## 2021-01-13 NOTE — PHYSICAL EXAM
[Appropriately responsive] : appropriately responsive [Alert] : alert [No Acute Distress] : no acute distress [Soft] : soft [Non-tender] : non-tender [Non-distended] : non-distended [No HSM] : No HSM [No Lesions] : no lesions [No Mass] : no mass [Oriented x3] : oriented x3 [Examination Of The Breasts] : a normal appearance [No Masses] : no breast masses were palpable [Vulvar Atrophy] : vulvar atrophy [Labia Majora] : normal [Labia Minora] : normal [Normal] : normal [Atrophy] : atrophy [Absent] : absent [Uterine Adnexae] : absent [No Tenderness] : no tenderness [Nl Sphincter Tone] : normal sphincter tone

## 2021-03-25 ENCOUNTER — APPOINTMENT (OUTPATIENT)
Dept: CARDIOLOGY | Facility: CLINIC | Age: 61
End: 2021-03-25

## 2021-04-07 ENCOUNTER — APPOINTMENT (OUTPATIENT)
Dept: CARDIOLOGY | Facility: CLINIC | Age: 61
End: 2021-04-07

## 2021-04-12 ENCOUNTER — APPOINTMENT (OUTPATIENT)
Dept: CARDIOLOGY | Facility: CLINIC | Age: 61
End: 2021-04-12
Payer: MEDICAID

## 2021-04-12 ENCOUNTER — NON-APPOINTMENT (OUTPATIENT)
Age: 61
End: 2021-04-12

## 2021-04-12 VITALS
HEART RATE: 57 BPM | TEMPERATURE: 97.3 F | SYSTOLIC BLOOD PRESSURE: 156 MMHG | DIASTOLIC BLOOD PRESSURE: 92 MMHG | RESPIRATION RATE: 16 BRPM | WEIGHT: 130 LBS | HEIGHT: 66 IN | BODY MASS INDEX: 20.89 KG/M2

## 2021-04-12 DIAGNOSIS — R06.00 DYSPNEA, UNSPECIFIED: ICD-10-CM

## 2021-04-12 DIAGNOSIS — Z92.29 PERSONAL HISTORY OF OTHER DRUG THERAPY: ICD-10-CM

## 2021-04-12 PROCEDURE — 93000 ELECTROCARDIOGRAM COMPLETE: CPT

## 2021-04-12 PROCEDURE — 99214 OFFICE O/P EST MOD 30 MIN: CPT

## 2021-04-12 PROCEDURE — 99072 ADDL SUPL MATRL&STAF TM PHE: CPT

## 2021-04-15 ENCOUNTER — APPOINTMENT (OUTPATIENT)
Dept: CARDIOLOGY | Facility: CLINIC | Age: 61
End: 2021-04-15
Payer: MEDICAID

## 2021-04-15 PROCEDURE — 99072 ADDL SUPL MATRL&STAF TM PHE: CPT

## 2021-04-15 PROCEDURE — 93306 TTE W/DOPPLER COMPLETE: CPT

## 2021-04-20 NOTE — ASSESSMENT
[FreeTextEntry1] : ECG: SR, LAE, anteroseptal Q waves c/w old ASMI \par \par  HDL 66 LDL 85  (7/2019) \par \par ECHO 10/2019:\par 1. Normal LV size and function, EF 60%\par 2. Equivocal diastolic parameters\par 3. RVE with  normal function\par 4. Moderate LAE/CARMEN\par 5. Mild to moderate MR\par 6. Moderate to severe TR, RVSP 52mmHg\par 7. RVVO \par \par EX NUCLEAR STRESS TEST 9/2018:\par 1. Negative for ischemia, EF 81%\par 2. PVCs\par 3. Normal BP response

## 2021-04-20 NOTE — DISCUSSION/SUMMARY
[FreeTextEntry1] : Patient is a 59yo F with CAD s/p CABG, MR, TR here for cardiac follow up.\par -Having increased dyspnea lately, needs ischemic work up and re-eval MR/TR. Never got testing done\par -BP up a bit today, Maybe component white coat HTN. Consider ARB or increase beta blocker at follow up\par \par 1. MILI to evaluate ischemia and MR/TR/PULM htn, full resting echo as well. Needs to reschedule \par 2. Continue ASA 81mg daily \par 3. Continue medical management of CAD , continue statin \par 4. RE-eval BP at follow up and during stress testing\par 5. Follow up after testing \par 6. Add atorvastatin 20mg qhs, unclear why off at this time. has CAD

## 2021-04-20 NOTE — ADDENDUM
[FreeTextEntry1] : 4/20/21: reviewed echo, due to new RWAM and suboptimal windows for MILI, will change to nuclear stress testing.

## 2021-04-20 NOTE — HISTORY OF PRESENT ILLNESS
[FreeTextEntry1] : Patient is a 59yo F with CAD s/p CABG,. MR, TR here for cardiac follow up.  HAs been walking more frequently, limited by shoulder pains. Does get a bit short of breath with walking.  Patient denies PND/orthopnea/palpitations/syncope/claudication.  Doing some light workouts and no complaints. FEels a little out of shape she states. Did not show for recent cardiac testing appt. GOt full series COVID vaccine. \par \par Looking to move down to [place] soon, delayed related to pandemic and may stay here now. STill finalizing divorce paperwork, filed she states. Now celebrating 10 years sober this month. Living with cousin now, not working. \par \par ROS: GI and  negative

## 2021-05-18 ENCOUNTER — APPOINTMENT (OUTPATIENT)
Dept: FAMILY MEDICINE | Facility: CLINIC | Age: 61
End: 2021-05-18

## 2021-05-29 ENCOUNTER — APPOINTMENT (OUTPATIENT)
Dept: FAMILY MEDICINE | Facility: CLINIC | Age: 61
End: 2021-05-29

## 2021-06-10 ENCOUNTER — APPOINTMENT (OUTPATIENT)
Dept: CARDIOLOGY | Facility: CLINIC | Age: 61
End: 2021-06-10
Payer: MEDICAID

## 2021-06-10 ENCOUNTER — APPOINTMENT (OUTPATIENT)
Dept: CARDIOLOGY | Facility: CLINIC | Age: 61
End: 2021-06-10

## 2021-06-10 PROCEDURE — 93015 CV STRESS TEST SUPVJ I&R: CPT

## 2021-06-10 PROCEDURE — 78452 HT MUSCLE IMAGE SPECT MULT: CPT

## 2021-06-10 PROCEDURE — A9500: CPT

## 2021-06-23 RX ORDER — KIT FOR THE PREPARATION OF TECHNETIUM TC99M SESTAMIBI 1 MG/5ML
INJECTION, POWDER, LYOPHILIZED, FOR SOLUTION PARENTERAL
Refills: 0 | Status: COMPLETED | OUTPATIENT
Start: 2021-06-23

## 2021-06-23 RX ADMIN — KIT FOR THE PREPARATION OF TECHNETIUM TC99M SESTAMIBI 0: 1 INJECTION, POWDER, LYOPHILIZED, FOR SOLUTION PARENTERAL at 00:00

## 2021-06-24 ENCOUNTER — APPOINTMENT (OUTPATIENT)
Dept: CARDIOLOGY | Facility: CLINIC | Age: 61
End: 2021-06-24
Payer: MEDICAID

## 2021-06-24 VITALS
RESPIRATION RATE: 16 BRPM | WEIGHT: 127 LBS | DIASTOLIC BLOOD PRESSURE: 89 MMHG | OXYGEN SATURATION: 97 % | BODY MASS INDEX: 20.41 KG/M2 | HEIGHT: 66 IN | SYSTOLIC BLOOD PRESSURE: 144 MMHG | HEART RATE: 76 BPM

## 2021-06-24 DIAGNOSIS — R06.02 SHORTNESS OF BREATH: ICD-10-CM

## 2021-06-24 PROCEDURE — 99214 OFFICE O/P EST MOD 30 MIN: CPT

## 2021-06-24 NOTE — HISTORY OF PRESENT ILLNESS
[FreeTextEntry1] : Patient is a 60yo F with CAD s/p CABG,. MR, TR here for cardiac follow up. WAs noting some increased dyspnea prior to last OV, underwent echo and nuclear stress testing.  Walking about 8000 steps per day. Gets SOB somewhat random but can be with exertion. Got full series COVID vaccine. Snores a lot at night, falls asleep at times during the day. Patient denies PND/orthopnea/edema/palpitations/syncope/claudication \par \par Looking to move down to [place] soon, delayed related to pandemic and may stay here now. STill finalizing divorce paperwork, filed she states. Now celebrating 10 years sober. Living with cousin now, not working. \par \par ROS: GI and  negative

## 2021-06-24 NOTE — ASSESSMENT
[FreeTextEntry1] : ECG: SR, LAE, anteroseptal Q waves c/w old ASMI \par \par  HDL 66 LDL 85  (7/2019) \par \par Exercise Nuclear Stress Test 6/2021:\par 1. Negative for ischemia/infarct, EF \par 2. Achieved 9 minutes, 10 METS\par 3. BP hypertensive at baseline, normal response \par 4. Hyperdynamic LV, EF 75%\par 5. DTS = 4\par \par ECHO 4/2021:\par 1. NOrmal LV size and function, EF 55-60%\par 2. Moderately Hypo basal inferior wall \par 3. Mild RVE with normal function\par 4. Moderate LAE, mild CARMEN\par 5. Mild to moderate MR\par 6. Severe TR\par \par ECHO 10/2019:\par 1. Normal LV size and function, EF 60%\par 2. Equivocal diastolic parameters\par 3. RVE with  normal function\par 4. Moderate LAE/CARMEN\par 5. Mild to moderate MR\par 6. Moderate to severe TR, RVSP 52mmHg\par 7. RVVO \par \par EX NUCLEAR STRESS TEST 9/2018:\par 1. Negative for ischemia, EF 81%\par 2. PVCs\par 3. Normal BP response

## 2021-06-24 NOTE — DISCUSSION/SUMMARY
[FreeTextEntry1] : Patient is a 61yo F with CAD s/p CABG, MR, TR here for cardiac follow up.\par -6/2021 nuclear stress test with good functional capacity and no ischemia, EF 75%\par -Echo with Normal LV/RV function, mild to moderate MR and severe TR (appears functional from annular dilatation), LAE/CARMEN and mild RVE. Chamber dimensions have not changed. good exercise tolerance during stress testing. Mild dyspnea unlikely related. No right sided failure signs. Does have symptoms to suggest significant MADDI and this could be contributing.  Will continue surveillance TR and eval for MADDI\par -BP up a bit today, Maybe component white coat HTN. Consider ARB or increase beta blocker at follow up\par \par 1. Continue surveillance of MR/TR, sleep study due to significant snoring, some daytime somnolence and signs RVE which is leading to TR\par 2. Continue ASA 81mg daily \par 3. Continue medical management of CAD , continue statin \par 4. Add valsartan 80mg daily for BP control. check BMP/Mg in a couple weeks\par 5. Follow up 2-3 months

## 2021-07-01 ENCOUNTER — APPOINTMENT (OUTPATIENT)
Age: 61
End: 2021-07-01

## 2021-07-26 ENCOUNTER — NON-APPOINTMENT (OUTPATIENT)
Age: 61
End: 2021-07-26

## 2021-09-20 ENCOUNTER — APPOINTMENT (OUTPATIENT)
Dept: CARDIOLOGY | Facility: CLINIC | Age: 61
End: 2021-09-20

## 2021-12-23 ENCOUNTER — EMERGENCY (EMERGENCY)
Facility: HOSPITAL | Age: 61
LOS: 1 days | Discharge: HOME | End: 2021-12-23
Attending: EMERGENCY MEDICINE
Payer: COMMERCIAL

## 2021-12-23 VITALS
HEART RATE: 71 BPM | TEMPERATURE: 98 F | OXYGEN SATURATION: 98 % | SYSTOLIC BLOOD PRESSURE: 153 MMHG | RESPIRATION RATE: 20 BRPM | DIASTOLIC BLOOD PRESSURE: 69 MMHG

## 2021-12-23 VITALS
WEIGHT: 119.93 LBS | HEIGHT: 65 IN | HEART RATE: 88 BPM | SYSTOLIC BLOOD PRESSURE: 173 MMHG | DIASTOLIC BLOOD PRESSURE: 102 MMHG | RESPIRATION RATE: 17 BRPM | TEMPERATURE: 98 F | OXYGEN SATURATION: 99 %

## 2021-12-23 DIAGNOSIS — Z98.89 OTHER SPECIFIED POSTPROCEDURAL STATES: Chronic | ICD-10-CM

## 2021-12-23 DIAGNOSIS — Z95.1 PRESENCE OF AORTOCORONARY BYPASS GRAFT: Chronic | ICD-10-CM

## 2021-12-23 DIAGNOSIS — Z96.611 PRESENCE OF RIGHT ARTIFICIAL SHOULDER JOINT: Chronic | ICD-10-CM

## 2021-12-23 DIAGNOSIS — Z90.710 ACQUIRED ABSENCE OF BOTH CERVIX AND UTERUS: Chronic | ICD-10-CM

## 2021-12-23 LAB
BASOPHILS # BLD AUTO: 0.1 K/UL — SIGNIFICANT CHANGE UP (ref 0–0.2)
BASOPHILS NFR BLD AUTO: 1.5 % — SIGNIFICANT CHANGE UP (ref 0–2)
EOSINOPHIL # BLD AUTO: 0.52 K/UL — HIGH (ref 0–0.5)
EOSINOPHIL NFR BLD AUTO: 7.8 % — HIGH (ref 0–6)
HCT VFR BLD CALC: 33.3 % — LOW (ref 34.5–45)
HGB BLD-MCNC: 10.5 G/DL — LOW (ref 11.5–15.5)
IMM GRANULOCYTES NFR BLD AUTO: 0.3 % — SIGNIFICANT CHANGE UP (ref 0–1.5)
LYMPHOCYTES # BLD AUTO: 1.59 K/UL — SIGNIFICANT CHANGE UP (ref 1–3.3)
LYMPHOCYTES # BLD AUTO: 23.7 % — SIGNIFICANT CHANGE UP (ref 13–44)
MCHC RBC-ENTMCNC: 29.5 PG — SIGNIFICANT CHANGE UP (ref 27–34)
MCHC RBC-ENTMCNC: 31.5 GM/DL — LOW (ref 32–36)
MCV RBC AUTO: 93.5 FL — SIGNIFICANT CHANGE UP (ref 80–100)
MONOCYTES # BLD AUTO: 0.4 K/UL — SIGNIFICANT CHANGE UP (ref 0–0.9)
MONOCYTES NFR BLD AUTO: 6 % — SIGNIFICANT CHANGE UP (ref 2–14)
NEUTROPHILS # BLD AUTO: 4.07 K/UL — SIGNIFICANT CHANGE UP (ref 1.8–7.4)
NEUTROPHILS NFR BLD AUTO: 60.7 % — SIGNIFICANT CHANGE UP (ref 43–77)
NT-PROBNP SERPL-SCNC: 393 PG/ML — HIGH (ref 0–300)
PLATELET # BLD AUTO: 265 K/UL — SIGNIFICANT CHANGE UP (ref 150–400)
RBC # BLD: 3.56 M/UL — LOW (ref 3.8–5.2)
RBC # FLD: 13.3 % — SIGNIFICANT CHANGE UP (ref 10.3–14.5)
WBC # BLD: 6.7 K/UL — SIGNIFICANT CHANGE UP (ref 3.8–10.5)
WBC # FLD AUTO: 6.7 K/UL — SIGNIFICANT CHANGE UP (ref 3.8–10.5)

## 2021-12-23 PROCEDURE — 93005 ELECTROCARDIOGRAM TRACING: CPT

## 2021-12-23 PROCEDURE — 99211 OFF/OP EST MAY X REQ PHY/QHP: CPT

## 2021-12-23 PROCEDURE — 96375 TX/PRO/DX INJ NEW DRUG ADDON: CPT

## 2021-12-23 PROCEDURE — 70450 CT HEAD/BRAIN W/O DYE: CPT | Mod: MA

## 2021-12-23 PROCEDURE — 71045 X-RAY EXAM CHEST 1 VIEW: CPT | Mod: 26

## 2021-12-23 PROCEDURE — 83880 ASSAY OF NATRIURETIC PEPTIDE: CPT

## 2021-12-23 PROCEDURE — 71045 X-RAY EXAM CHEST 1 VIEW: CPT

## 2021-12-23 PROCEDURE — 85025 COMPLETE CBC W/AUTO DIFF WBC: CPT

## 2021-12-23 PROCEDURE — 96374 THER/PROPH/DIAG INJ IV PUSH: CPT

## 2021-12-23 PROCEDURE — 36415 COLL VENOUS BLD VENIPUNCTURE: CPT

## 2021-12-23 PROCEDURE — 80053 COMPREHEN METABOLIC PANEL: CPT

## 2021-12-23 PROCEDURE — 99285 EMERGENCY DEPT VISIT HI MDM: CPT

## 2021-12-23 PROCEDURE — 93010 ELECTROCARDIOGRAM REPORT: CPT

## 2021-12-23 PROCEDURE — 84484 ASSAY OF TROPONIN QUANT: CPT

## 2021-12-23 PROCEDURE — 70450 CT HEAD/BRAIN W/O DYE: CPT | Mod: 26,MA

## 2021-12-23 PROCEDURE — 99284 EMERGENCY DEPT VISIT MOD MDM: CPT | Mod: 25

## 2021-12-23 RX ORDER — SODIUM CHLORIDE 9 MG/ML
1000 INJECTION INTRAMUSCULAR; INTRAVENOUS; SUBCUTANEOUS ONCE
Refills: 0 | Status: DISCONTINUED | OUTPATIENT
Start: 2021-12-23 | End: 2021-12-23

## 2021-12-23 RX ORDER — LABETALOL HCL 100 MG
10 TABLET ORAL ONCE
Refills: 0 | Status: COMPLETED | OUTPATIENT
Start: 2021-12-23 | End: 2021-12-23

## 2021-12-23 RX ORDER — SODIUM CHLORIDE 9 MG/ML
500 INJECTION INTRAMUSCULAR; INTRAVENOUS; SUBCUTANEOUS ONCE
Refills: 0 | Status: COMPLETED | OUTPATIENT
Start: 2021-12-23 | End: 2021-12-23

## 2021-12-23 RX ORDER — KETOROLAC TROMETHAMINE 30 MG/ML
30 SYRINGE (ML) INJECTION ONCE
Refills: 0 | Status: DISCONTINUED | OUTPATIENT
Start: 2021-12-23 | End: 2021-12-23

## 2021-12-23 RX ADMIN — Medication 10 MILLIGRAM(S): at 16:43

## 2021-12-23 RX ADMIN — Medication 30 MILLIGRAM(S): at 16:43

## 2021-12-23 RX ADMIN — SODIUM CHLORIDE 500 MILLILITER(S): 9 INJECTION INTRAMUSCULAR; INTRAVENOUS; SUBCUTANEOUS at 17:08

## 2021-12-23 NOTE — ED ADULT NURSE NOTE - OBJECTIVE STATEMENT
Patient is a 61 year old female complaining of headache and elvated BP. Patient denies any visual disturbance, Vss At this time,Denies cp, SOB. lungs clear. Abd soft and non distended. Skin WNL for race. Patient denies any other complaints.

## 2021-12-23 NOTE — ED PROVIDER NOTE - PATIENT PORTAL LINK FT
You can access the FollowMyHealth Patient Portal offered by [facility] by registering at the following website: [email or web address]. By joining [employer]’s FollowMyHealth portal, you will also be able to view your health information using other applications (apps) compatible with our system.

## 2021-12-23 NOTE — ED PROVIDER NOTE - OBJECTIVE STATEMENT
60 y/o F pt with hx of htn, chf, CAD s/p CABG presenting today with c/o headache. States she woke up this am with posterior headache, took bp meds and checked  bp finding sbp to be 190s. States bp at home is typically 120/70s. Called cardiologist who suggested pt take 2nd dose of bp meds that did not improve sx and pt came to the ED. Sx are associated with mild blurred vision and chest pain. Pt denies any dyspnea, fevers, n/v/d, abdominal pain, dysuria, cough, congestion, sore throat, neck pain, back pain, weakness, numbness, tingling, syncope, or other complaint.

## 2021-12-23 NOTE — ED PROVIDER NOTE - CLINICAL SUMMARY MEDICAL DECISION MAKING FREE TEXT BOX
Pt presenting for hypertensive urgency with headache, blurred vision, and chest pain in the setting of elevated bp. BP here improved from home, but pt continuing to c/o sx. Neuro intact, will treat pain and bp, order labs, ekg, trop, bnp, CT head, reeval.

## 2021-12-23 NOTE — ED PROVIDER NOTE - NSFOLLOWUPINSTRUCTIONS_ED_ALL_ED_FT
Hypertension    Hypertension, commonly called high blood pressure, is when the force of blood pumping through your arteries is too strong. Hypertension forces your heart to work harder to pump blood. Your arteries may become narrow or stiff. Having untreated or uncontrolled hypertension for a long period of time can cause heart attack, stroke, kidney disease, and other problems. If started on a medication, take exactly as prescribed by your health care professional. Maintain a healthy lifestyle and follow up with your primary care physician.    SEEK IMMEDIATE MEDICAL CARE IF YOU HAVE ANY OF THE FOLLOWING SYMPTOMS: severe headache, confusion, chest pain, abdominal pain, vomiting, or shortness of breath.     Headache    A headache is pain or discomfort felt around the head or neck area. The specific cause of a headache may not be found as there are many types including tension headaches, migraine headaches, and cluster headaches. Watch your condition for any changes. Things you can do to manage your pain include taking over the counter and prescription medications as instructed by your health care provider, lying down in a dark quiet room, limiting stress, getting regular sleep, and refraining from alcohol and tobacco products.    SEEK IMMEDIATE MEDICAL CARE IF YOU HAVE ANY OF THE FOLLOWING SYMPTOMS: fever, vomiting, stiff neck, loss of vision, problems with speech, muscle weakness, loss of balance, trouble walking, passing out, or confusion.

## 2021-12-23 NOTE — ED STATDOCS - OBJECTIVE STATEMENT
61y female with a PMHx of CAD, HLD, HTN, pulmonary HTN, meningitis, heart attack, anemia, hyperthyroidism, mitral regurgitation,  tricuspid regurgitation, opiate abuse (4y ago), herniated nucleus populous, depression presents to the ED c/o headache, HTN onset x1 day. Pt endorses secondary complaints of palpitations and "pulpating sense in her head", with some dizziness, lightheadedness, difficulty breathing. Pt then checked her blood pressure and called her cardiologist, who told her to take an additional dose of her HTN medication. Pt did not have any relief of symptoms, so she came to the ED. Sent to [facility] for further evaluation     Cardiologist: Dr. Jn Chaney

## 2021-12-23 NOTE — ED PROVIDER NOTE - PROGRESS NOTE DETAILS
Reviewed negative w/u results with pt. Pt reports feeling much improved. Bp improved. Comfortable with plan for d/c. Pt agrees to f/u with pcp. Return instructions given, questions answered. - Abdullahi Romero, PGY-3

## 2021-12-23 NOTE — ED PROVIDER NOTE - PHYSICAL EXAMINATION
Gen: no acute distress  Head: normocephalic, atraumatic  Eyes: pupils 5mm bilaterally, sluggishly reactive, EOMI  Lung: CTAB, no respiratory distress, no wheezing, rales, rhonchi  CV: normal s1/s2, rrr,   Abd: soft, no tenderness, non-distended  MSK: No edema, no visible deformities, full range of motion in all 4 extremities  Neuro: No focal neurologic deficits  Skin: No rash   Psych: normal affect

## 2021-12-23 NOTE — ED ADULT TRIAGE NOTE - CHIEF COMPLAINT QUOTE
Pt. c/o posterior headache and htn that began today.  Pt with history of high blood pressure states that today she woke up and had a headache so she took pressure and noted it to be 193/110; pt. called her cardiologist and was instructed to take 2 additional valsartan @ 11am; with minimal relief.  Pt. denies vision changes.  Pt. endorsing she felt lightheaded earlier when she went to stand up

## 2021-12-23 NOTE — ED STATDOCS - PROGRESS NOTE DETAILS
61y female with a PMHx of CAD, HLD, HTN, pulmonary HTN, meningitis, heart attack, anemia, hyperthyroidism, mitral regurgitation,  tricuspid regurgitation, opiate abuse (4y ago), herniated nucleus populous, depression presents to the ED c/o headache, HTN onset x1 day. Pt endorses secondary complaints of palpitations and "pulpating sense in her head", with some dizziness, lightheadedness, difficulty breathing. Pt then checked her blood pressure and called her cardiologist, who told her to take an additional dose of her HTN medication. Pt did not have any relief of symptoms, so she came to the ED. Sent to [facility] for further evaluation     Cardiologist: Dr. Jn Chaney

## 2021-12-23 NOTE — ED STATDOCS - NSICDXPASTSURGICALHX_GEN_ALL_CORE_FT
PAST SURGICAL HISTORY:  History of cholecystectomy     S/P appendectomy     S/P CABG (coronary artery bypass graft) 2012    S/P  section     S/P hysterectomy     S/P shoulder replacement, right

## 2021-12-23 NOTE — ED PROVIDER NOTE - ATTENDING CONTRIBUTION TO CARE
Pt. awake and alert. Pt. moving all extremities. I, Dr. Rice, performed a face to face bedside interview with this patient regarding history of present illness, review of symptoms and relevant past medical, social and family history.  I completed an independent physical examination.  I have also reviewed the resident's note(s) and discussed the plan with the resident.

## 2021-12-23 NOTE — ED STATDOCS - NSICDXPASTMEDICALHX_GEN_ALL_CORE_FT
PAST MEDICAL HISTORY:  Anemia     CAD (coronary artery disease)     Chronic back pain greater than 3 months duration     Depression     Heart attack     Herniated nucleus pulposus     High cholesterol     Hypertension     Hyperthyroidism     Meningitis     Meningitis     Migraine     Mitral regurgitation     Opiate abuse, continuous 4 years ago    Pulmonary hypertension     Tricuspid regurgitation

## 2021-12-29 ENCOUNTER — APPOINTMENT (OUTPATIENT)
Dept: CARDIOLOGY | Facility: CLINIC | Age: 61
End: 2021-12-29
Payer: MEDICAID

## 2021-12-29 VITALS — RESPIRATION RATE: 10 BRPM | HEART RATE: 76 BPM | SYSTOLIC BLOOD PRESSURE: 160 MMHG | DIASTOLIC BLOOD PRESSURE: 92 MMHG

## 2022-01-14 ENCOUNTER — APPOINTMENT (OUTPATIENT)
Dept: CARDIOLOGY | Facility: CLINIC | Age: 62
End: 2022-01-14
Payer: MEDICAID

## 2022-01-14 VITALS — SYSTOLIC BLOOD PRESSURE: 160 MMHG | DIASTOLIC BLOOD PRESSURE: 84 MMHG

## 2022-01-14 VITALS
BODY MASS INDEX: 21.69 KG/M2 | WEIGHT: 135 LBS | RESPIRATION RATE: 15 BRPM | DIASTOLIC BLOOD PRESSURE: 100 MMHG | HEIGHT: 66 IN | HEART RATE: 75 BPM | SYSTOLIC BLOOD PRESSURE: 170 MMHG

## 2022-01-14 PROCEDURE — 93000 ELECTROCARDIOGRAM COMPLETE: CPT

## 2022-01-14 PROCEDURE — 99214 OFFICE O/P EST MOD 30 MIN: CPT

## 2022-01-14 RX ORDER — VALSARTAN 80 MG/1
80 TABLET, COATED ORAL DAILY
Qty: 90 | Refills: 0 | Status: DISCONTINUED | COMMUNITY
Start: 2021-06-24 | End: 2022-01-14

## 2022-01-14 NOTE — DISCUSSION/SUMMARY
[FreeTextEntry1] : Patient is a 62yo F with CAD s/p CABG, MR, TR here for cardiac follow up.\par -6/2021 nuclear stress test with good functional capacity and no ischemia, EF 75%\par -Echo with Normal LV/RV function, mild to moderate MR and severe TR (appears functional from annular dilatation), LAE/CARMEN and mild RVE. Chamber dimensions have not changed. good exercise tolerance during stress testing. No right sided failure signs. Does have symptoms to suggest significant MADDI and this could be contributing.  Will continue surveillance TR and eval for MADDI\par -Still with uncontrolled HTN, possibly related to MADDI and stress of divorve could be contributing. Needs better control. \par \par 1. Continue surveillance of MR/TR, still needs  sleep study due to significant snoring, some daytime somnolence and signs RVE which is leading to TR\par 2. Continue ASA 81mg daily \par 3. Continue medical management of CAD , continue statin \par 4. Change Valsartan to Valsartan-/25mg daily\par 5. SALVADOR/LE duplex to evaluate claudication type symptoms

## 2022-01-14 NOTE — HISTORY OF PRESENT ILLNESS
[FreeTextEntry1] : Patient is a 60yo F with CAD s/p CABG,. MR, severe TR here for cardiac follow up. Doing fairly well, denies CP/SOB. Patient denies PND/orthopnea/edema/palpitations/syncope/claudication. Advised sleep study after last OV. Valsartan also added for HTN at last OV. Getting severe leg pain when going up stairs, worse in calves. FEels burning. No back pain. ALso worse with walking, has been less active. BP has been high and taking VAlsartan now bid. \par \par Looking to move down to [place] soon, delayed related to pandemic and may stay here now. STill finalizing divorce paperwork, filed she states. 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 Living with cousin now, not working.  \par \par ROS: GI and  negative

## 2022-01-18 ENCOUNTER — APPOINTMENT (OUTPATIENT)
Dept: INTERNAL MEDICINE | Facility: CLINIC | Age: 62
End: 2022-01-18
Payer: MEDICAID

## 2022-01-18 VITALS
DIASTOLIC BLOOD PRESSURE: 88 MMHG | HEART RATE: 59 BPM | HEIGHT: 63 IN | WEIGHT: 128 LBS | OXYGEN SATURATION: 98 % | BODY MASS INDEX: 22.68 KG/M2 | TEMPERATURE: 97.7 F | SYSTOLIC BLOOD PRESSURE: 162 MMHG

## 2022-01-18 VITALS — SYSTOLIC BLOOD PRESSURE: 122 MMHG | DIASTOLIC BLOOD PRESSURE: 60 MMHG | RESPIRATION RATE: 16 BRPM

## 2022-01-18 DIAGNOSIS — F10.10 ALCOHOL ABUSE, UNCOMPLICATED: ICD-10-CM

## 2022-01-18 DIAGNOSIS — M79.671 PAIN IN RIGHT FOOT: ICD-10-CM

## 2022-01-18 DIAGNOSIS — Z86.61 PERSONAL HISTORY OF INFECTIONS OF THE CENTRAL NERVOUS SYSTEM: ICD-10-CM

## 2022-01-18 DIAGNOSIS — Z80.0 FAMILY HISTORY OF MALIGNANT NEOPLASM OF DIGESTIVE ORGANS: ICD-10-CM

## 2022-01-18 DIAGNOSIS — Z00.00 ENCOUNTER FOR GENERAL ADULT MEDICAL EXAMINATION W/OUT ABNORMAL FINDINGS: ICD-10-CM

## 2022-01-18 DIAGNOSIS — M25.512 PAIN IN LEFT SHOULDER: ICD-10-CM

## 2022-01-18 DIAGNOSIS — Z87.891 PERSONAL HISTORY OF NICOTINE DEPENDENCE: ICD-10-CM

## 2022-01-18 DIAGNOSIS — F19.11 OTHER PSYCHOACTIVE SUBSTANCE ABUSE, IN REMISSION: ICD-10-CM

## 2022-01-18 DIAGNOSIS — G89.29 PAIN IN LEFT SHOULDER: ICD-10-CM

## 2022-01-18 DIAGNOSIS — M79.672 PAIN IN RIGHT FOOT: ICD-10-CM

## 2022-01-18 PROCEDURE — 99386 PREV VISIT NEW AGE 40-64: CPT | Mod: 25

## 2022-01-18 PROCEDURE — G0444 DEPRESSION SCREEN ANNUAL: CPT | Mod: 59

## 2022-01-18 RX ORDER — AMOXICILLIN 500 MG/1
500 CAPSULE ORAL
Qty: 21 | Refills: 0 | Status: COMPLETED | COMMUNITY
Start: 2021-02-25 | End: 2022-01-17

## 2022-01-18 RX ORDER — MELOXICAM 7.5 MG/1
7.5 TABLET ORAL
Qty: 30 | Refills: 0 | Status: COMPLETED | COMMUNITY
Start: 2021-08-03

## 2022-01-18 RX ORDER — GABAPENTIN 400 MG/1
400 CAPSULE ORAL
Qty: 84 | Refills: 0 | Status: COMPLETED | COMMUNITY
Start: 2021-02-09 | End: 2022-01-17

## 2022-01-18 RX ORDER — BUPRENORPHINE AND NALOXONE 8; 2 MG/1; MG/1
8-2 TABLET SUBLINGUAL
Qty: 14 | Refills: 0 | Status: COMPLETED | COMMUNITY
Start: 2020-10-30 | End: 2022-01-17

## 2022-01-18 RX ORDER — METOPROLOL SUCCINATE 25 MG/1
25 TABLET, EXTENDED RELEASE ORAL
Qty: 180 | Refills: 0 | Status: COMPLETED | COMMUNITY
Start: 2021-12-29

## 2022-01-18 RX ORDER — GABAPENTIN 300 MG/1
300 CAPSULE ORAL 3 TIMES DAILY
Refills: 0 | Status: COMPLETED | COMMUNITY
End: 2022-01-17

## 2022-01-18 NOTE — HISTORY OF PRESENT ILLNESS
[FreeTextEntry1] : Patient presents for new patient evaluation and CPE.  [de-identified] : Patient presents for new patient evaluation and CPE.  Patient reports epigastric discomfort with swallowing.  She states that the food bolus gets stuck at the gastroesophageal junction and she has to spit up with food with associated mucus production.  Patient reports noticing epigastric distention with Valsalva maneuver.\par Patient also reports chronic fatigue.

## 2022-01-18 NOTE — PHYSICAL EXAM
[No Acute Distress] : no acute distress [Well Nourished] : well nourished [Well Developed] : well developed [Well-Appearing] : well-appearing [Normal Sclera/Conjunctiva] : normal sclera/conjunctiva [PERRL] : pupils equal round and reactive to light [EOMI] : extraocular movements intact [Normal Outer Ear/Nose] : the outer ears and nose were normal in appearance [Normal Oropharynx] : the oropharynx was normal [No JVD] : no jugular venous distention [No Lymphadenopathy] : no lymphadenopathy [Supple] : supple [Thyroid Normal, No Nodules] : the thyroid was normal and there were no nodules present [No Respiratory Distress] : no respiratory distress  [No Accessory Muscle Use] : no accessory muscle use [Clear to Auscultation] : lungs were clear to auscultation bilaterally [Normal Rate] : normal rate  [Regular Rhythm] : with a regular rhythm [Normal S1, S2] : normal S1 and S2 [No Murmur] : no murmur heard [No Carotid Bruits] : no carotid bruits [No Abdominal Bruit] : a ~M bruit was not heard ~T in the abdomen [No Varicosities] : no varicosities [Pedal Pulses Present] : the pedal pulses are present [No Edema] : there was no peripheral edema [No Palpable Aorta] : no palpable aorta [No Extremity Clubbing/Cyanosis] : no extremity clubbing/cyanosis [Soft] : abdomen soft [Non Tender] : non-tender [Non-distended] : non-distended [No Masses] : no abdominal mass palpated [No HSM] : no HSM [Normal Bowel Sounds] : normal bowel sounds [Normal Posterior Cervical Nodes] : no posterior cervical lymphadenopathy [Normal Anterior Cervical Nodes] : no anterior cervical lymphadenopathy [No CVA Tenderness] : no CVA  tenderness [No Spinal Tenderness] : no spinal tenderness [No Joint Swelling] : no joint swelling [Grossly Normal Strength/Tone] : grossly normal strength/tone [No Rash] : no rash [Coordination Grossly Intact] : coordination grossly intact [No Focal Deficits] : no focal deficits [Normal Gait] : normal gait [Speech Grossly Normal] : speech grossly normal [Memory Grossly Normal] : memory grossly normal [Normal Affect] : the affect was normal [Alert and Oriented x3] : oriented to person, place, and time [Normal Mood] : the mood was normal [Normal Insight/Judgement] : insight and judgment were intact [de-identified] : Epigastric ventral hernia

## 2022-01-18 NOTE — HEALTH RISK ASSESSMENT
[Fair] : ~his/her~ current health as fair  [Good] : ~his/her~  mood as  good [Former] : Former [0-4] : 0-4 [No] : In the past 12 months have you used drugs other than those required for medical reasons? No [No falls in past year] : Patient reported no falls in the past year [1] : 1) Little interest or pleasure doing things for several days (1) [0] : 2) Feeling down, depressed, or hopeless: Not at all (0) [PHQ-2 Negative - No further assessment needed] : PHQ-2 Negative - No further assessment needed [HIV Test offered] : HIV Test offered [Hepatitis C test offered] : Hepatitis C test offered [None] : None [With Family] : lives with family [# of Members in Household ___] :  household currently consist of [unfilled] member(s) [Retired] : retired [College] : College [] :  [# Of Children ___] : has [unfilled] children [Sexually Active] : sexually active [Feels Safe at Home] : Feels safe at home [Fully functional (bathing, dressing, toileting, transferring, walking, feeding)] : Fully functional (bathing, dressing, toileting, transferring, walking, feeding) [Fully functional (using the telephone, shopping, preparing meals, housekeeping, doing laundry, using] : Fully functional and needs no help or supervision to perform IADLs (using the telephone, shopping, preparing meals, housekeeping, doing laundry, using transportation, managing medications and managing finances) [Smoke Detector] : smoke detector [Carbon Monoxide Detector] : carbon monoxide detector [Seat Belt] :  uses seat belt [Sunscreen] : uses sunscreen [Name: ___] : Health Care Proxy's Name: [unfilled]  [Relationship: ___] : Relationship: [unfilled] [Aggressive treatment] : aggressive treatment [I will adhere to the patient's wishes.] : I will adhere to the patient's wishes. [Time Spent: ___ minutes] : Time Spent: [unfilled] minutes [FreeTextEntry1] : Esophageal discomfort [YearQuit] : 2011 [de-identified] : Walking [de-identified] : Low sodium and low cholesterol high-fiber diet [[id number]] : 1 [High Risk Behavior] : no high risk behavior [Reports changes in hearing] : Reports no changes in hearing [Reports changes in vision] : Reports no changes in vision [Reports changes in dental health] : Reports no changes in dental health [Guns at Home] : no guns at home [AdvancecareDate] : 01/22

## 2022-01-18 NOTE — ASSESSMENT
[FreeTextEntry1] : 61 years old woman with multiple medical comorbidities now with ventral hernia and esophageal dysphagia.

## 2022-01-18 NOTE — PLAN
[FreeTextEntry1] : Continue for current treatments.\par Labs done in office\par Further management pending lab results.\par Follow-up in 6 months.\par GI evaluation.

## 2022-01-18 NOTE — HEALTH RISK ASSESSMENT
[Fair] : ~his/her~ current health as fair  [Good] : ~his/her~  mood as  good [Former] : Former [0-4] : 0-4 [No] : In the past 12 months have you used drugs other than those required for medical reasons? No [No falls in past year] : Patient reported no falls in the past year [1] : 1) Little interest or pleasure doing things for several days (1) [0] : 2) Feeling down, depressed, or hopeless: Not at all (0) [PHQ-2 Negative - No further assessment needed] : PHQ-2 Negative - No further assessment needed [HIV Test offered] : HIV Test offered [Hepatitis C test offered] : Hepatitis C test offered [None] : None [With Family] : lives with family [# of Members in Household ___] :  household currently consist of [unfilled] member(s) [Retired] : retired [College] : College [] :  [# Of Children ___] : has [unfilled] children [Sexually Active] : sexually active [Feels Safe at Home] : Feels safe at home [Fully functional (bathing, dressing, toileting, transferring, walking, feeding)] : Fully functional (bathing, dressing, toileting, transferring, walking, feeding) [Fully functional (using the telephone, shopping, preparing meals, housekeeping, doing laundry, using] : Fully functional and needs no help or supervision to perform IADLs (using the telephone, shopping, preparing meals, housekeeping, doing laundry, using transportation, managing medications and managing finances) [Smoke Detector] : smoke detector [Carbon Monoxide Detector] : carbon monoxide detector [Seat Belt] :  uses seat belt [Sunscreen] : uses sunscreen [Name: ___] : Health Care Proxy's Name: [unfilled]  [Relationship: ___] : Relationship: [unfilled] [Aggressive treatment] : aggressive treatment [I will adhere to the patient's wishes.] : I will adhere to the patient's wishes. [Time Spent: ___ minutes] : Time Spent: [unfilled] minutes [FreeTextEntry1] : Esophageal discomfort [YearQuit] : 2011 [de-identified] : Walking [de-identified] : Low sodium and low cholesterol high-fiber diet [[id number]] : 1 [High Risk Behavior] : no high risk behavior [Reports changes in hearing] : Reports no changes in hearing [Reports changes in vision] : Reports no changes in vision [Reports changes in dental health] : Reports no changes in dental health [Guns at Home] : no guns at home [AdvancecareDate] : 01/22

## 2022-01-18 NOTE — REVIEW OF SYSTEMS
[Recent Change In Weight] : ~T recent weight change [Joint Pain] : joint pain [Negative] : Heme/Lymph [FreeTextEntry2] : 20 lbs weight loss in past 3 months.

## 2022-01-18 NOTE — PHYSICAL EXAM
98 [No Acute Distress] : no acute distress [Well Nourished] : well nourished [Well Developed] : well developed [Well-Appearing] : well-appearing [Normal Sclera/Conjunctiva] : normal sclera/conjunctiva [PERRL] : pupils equal round and reactive to light [EOMI] : extraocular movements intact [Normal Outer Ear/Nose] : the outer ears and nose were normal in appearance [Normal Oropharynx] : the oropharynx was normal [No JVD] : no jugular venous distention [No Lymphadenopathy] : no lymphadenopathy [Supple] : supple [Thyroid Normal, No Nodules] : the thyroid was normal and there were no nodules present [No Respiratory Distress] : no respiratory distress  [No Accessory Muscle Use] : no accessory muscle use [Clear to Auscultation] : lungs were clear to auscultation bilaterally [Normal Rate] : normal rate  [Regular Rhythm] : with a regular rhythm [Normal S1, S2] : normal S1 and S2 [No Murmur] : no murmur heard [No Carotid Bruits] : no carotid bruits [No Abdominal Bruit] : a ~M bruit was not heard ~T in the abdomen [No Varicosities] : no varicosities [Pedal Pulses Present] : the pedal pulses are present [No Edema] : there was no peripheral edema [No Palpable Aorta] : no palpable aorta [No Extremity Clubbing/Cyanosis] : no extremity clubbing/cyanosis [Soft] : abdomen soft [Non Tender] : non-tender [Non-distended] : non-distended [No Masses] : no abdominal mass palpated [No HSM] : no HSM [Normal Bowel Sounds] : normal bowel sounds [Normal Posterior Cervical Nodes] : no posterior cervical lymphadenopathy [Normal Anterior Cervical Nodes] : no anterior cervical lymphadenopathy [No CVA Tenderness] : no CVA  tenderness [No Spinal Tenderness] : no spinal tenderness [No Joint Swelling] : no joint swelling [Grossly Normal Strength/Tone] : grossly normal strength/tone [No Rash] : no rash [Coordination Grossly Intact] : coordination grossly intact [No Focal Deficits] : no focal deficits [Normal Gait] : normal gait [Speech Grossly Normal] : speech grossly normal [Memory Grossly Normal] : memory grossly normal [Normal Affect] : the affect was normal [Alert and Oriented x3] : oriented to person, place, and time [Normal Mood] : the mood was normal [Normal Insight/Judgement] : insight and judgment were intact [de-identified] : Epigastric ventral hernia

## 2022-01-18 NOTE — HISTORY OF PRESENT ILLNESS
[FreeTextEntry1] : Patient presents for new patient evaluation and CPE.  [de-identified] : Patient presents for new patient evaluation and CPE.  Patient reports epigastric discomfort with swallowing.  She states that the food bolus gets stuck at the gastroesophageal junction and she has to spit up with food with associated mucus production.  Patient reports noticing epigastric distention with Valsalva maneuver.\par Patient also reports chronic fatigue.

## 2022-01-24 ENCOUNTER — APPOINTMENT (OUTPATIENT)
Dept: CARDIOLOGY | Facility: CLINIC | Age: 62
End: 2022-01-24
Payer: MEDICAID

## 2022-01-24 PROCEDURE — 93925 LOWER EXTREMITY STUDY: CPT

## 2022-01-26 ENCOUNTER — APPOINTMENT (OUTPATIENT)
Dept: INTERNAL MEDICINE | Facility: CLINIC | Age: 62
End: 2022-01-26
Payer: MEDICAID

## 2022-01-26 VITALS
HEIGHT: 63 IN | BODY MASS INDEX: 22.86 KG/M2 | WEIGHT: 129 LBS | OXYGEN SATURATION: 97 % | HEART RATE: 74 BPM | TEMPERATURE: 97.8 F | DIASTOLIC BLOOD PRESSURE: 71 MMHG | SYSTOLIC BLOOD PRESSURE: 137 MMHG

## 2022-01-26 DIAGNOSIS — Z12.11 ENCOUNTER FOR SCREENING FOR MALIGNANT NEOPLASM OF COLON: ICD-10-CM

## 2022-01-26 PROCEDURE — 99203 OFFICE O/P NEW LOW 30 MIN: CPT | Mod: 25

## 2022-01-26 NOTE — HISTORY OF PRESENT ILLNESS
[FreeTextEntry1] : Father passed away age 79 esophageal cancer. started 1 year prior. \par Bothy parents had crohns disease. \par  She has a problem with food sticking in her esophagus- has been going on for 1 year\par She had MI 9 years ago and is s/p CABG x1 9 years \par she sees a cardiologist .  \par she also gets midepigastric pain that is helped with tums. \par Her BM- are urgent at times. - last colonoscopy was many years ago. 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 \par  And now is going through divorce.

## 2022-01-27 LAB
BASOPHILS # BLD AUTO: 0.09 K/UL
BASOPHILS NFR BLD AUTO: 1.7 %
EOSINOPHIL # BLD AUTO: 0.43 K/UL
EOSINOPHIL NFR BLD AUTO: 8.2 %
FERRITIN SERPL-MCNC: 24 NG/ML
FOLATE SERPL-MCNC: >20 NG/ML
HCT VFR BLD CALC: 33.1 %
HGB BLD-MCNC: 10.6 G/DL
IMM GRANULOCYTES NFR BLD AUTO: 0.2 %
IRON SATN MFR SERPL: 18 %
IRON SERPL-MCNC: 79 UG/DL
LYMPHOCYTES # BLD AUTO: 1.92 K/UL
LYMPHOCYTES NFR BLD AUTO: 36.4 %
MAN DIFF?: NORMAL
MCHC RBC-ENTMCNC: 30 PG
MCHC RBC-ENTMCNC: 32 GM/DL
MCV RBC AUTO: 93.8 FL
MONOCYTES # BLD AUTO: 0.37 K/UL
MONOCYTES NFR BLD AUTO: 7 %
NEUTROPHILS # BLD AUTO: 2.45 K/UL
NEUTROPHILS NFR BLD AUTO: 46.5 %
PLATELET # BLD AUTO: 192 K/UL
RBC # BLD: 3.53 M/UL
RBC # FLD: 12.9 %
TIBC SERPL-MCNC: 450 UG/DL
UIBC SERPL-MCNC: 371 UG/DL
VIT B12 SERPL-MCNC: 487 PG/ML
WBC # FLD AUTO: 5.27 K/UL

## 2022-02-08 ENCOUNTER — NON-APPOINTMENT (OUTPATIENT)
Age: 62
End: 2022-02-08

## 2022-02-08 LAB
25(OH)D3 SERPL-MCNC: 26.7 NG/ML
ALBUMIN SERPL ELPH-MCNC: 4.3 G/DL
ALP BLD-CCNC: 158 U/L
ALT SERPL-CCNC: 14 U/L
ANION GAP SERPL CALC-SCNC: 11 MMOL/L
AST SERPL-CCNC: 23 U/L
BASOPHILS # BLD AUTO: 0.07 K/UL
BASOPHILS NFR BLD AUTO: 1.2 %
BILIRUB SERPL-MCNC: 0.2 MG/DL
BUN SERPL-MCNC: 14 MG/DL
CALCIUM SERPL-MCNC: 9.3 MG/DL
CHLORIDE SERPL-SCNC: 104 MMOL/L
CHOLEST SERPL-MCNC: 168 MG/DL
CO2 SERPL-SCNC: 26 MMOL/L
CREAT SERPL-MCNC: 0.82 MG/DL
EOSINOPHIL # BLD AUTO: 0.41 K/UL
EOSINOPHIL NFR BLD AUTO: 6.7 %
ESTIMATED AVERAGE GLUCOSE: 108 MG/DL
GLUCOSE SERPL-MCNC: 106 MG/DL
HBA1C MFR BLD HPLC: 5.4 %
HCT VFR BLD CALC: 34.8 %
HCV AB SER QL: NONREACTIVE
HCV S/CO RATIO: 0.12 S/CO
HDLC SERPL-MCNC: 67 MG/DL
HGB BLD-MCNC: 10.6 G/DL
HIV1+2 AB SPEC QL IA.RAPID: NONREACTIVE
IMM GRANULOCYTES NFR BLD AUTO: 0.3 %
LDLC SERPL CALC-MCNC: 66 MG/DL
LYMPHOCYTES # BLD AUTO: 1.43 K/UL
LYMPHOCYTES NFR BLD AUTO: 23.5 %
MAN DIFF?: NORMAL
MCHC RBC-ENTMCNC: 29.6 PG
MCHC RBC-ENTMCNC: 30.5 GM/DL
MCV RBC AUTO: 97.2 FL
MONOCYTES # BLD AUTO: 0.42 K/UL
MONOCYTES NFR BLD AUTO: 6.9 %
NEUTROPHILS # BLD AUTO: 3.73 K/UL
NEUTROPHILS NFR BLD AUTO: 61.4 %
NONHDLC SERPL-MCNC: 101 MG/DL
PLATELET # BLD AUTO: 278 K/UL
POTASSIUM SERPL-SCNC: 5 MMOL/L
PROT SERPL-MCNC: 6.8 G/DL
RBC # BLD: 3.58 M/UL
RBC # FLD: 13.3 %
SODIUM SERPL-SCNC: 141 MMOL/L
TRIGL SERPL-MCNC: 176 MG/DL
TSH SERPL-ACNC: 0.68 UIU/ML
WBC # FLD AUTO: 6.08 K/UL

## 2022-02-11 ENCOUNTER — APPOINTMENT (OUTPATIENT)
Dept: CARDIOLOGY | Facility: CLINIC | Age: 62
End: 2022-02-11
Payer: MEDICAID

## 2022-02-11 PROCEDURE — 93923 UPR/LXTR ART STDY 3+ LVLS: CPT

## 2022-02-24 ENCOUNTER — APPOINTMENT (OUTPATIENT)
Dept: INTERNAL MEDICINE | Facility: CLINIC | Age: 62
End: 2022-02-24
Payer: MEDICAID

## 2022-02-24 VITALS
DIASTOLIC BLOOD PRESSURE: 81 MMHG | HEIGHT: 63 IN | BODY MASS INDEX: 24.27 KG/M2 | WEIGHT: 137 LBS | SYSTOLIC BLOOD PRESSURE: 146 MMHG | HEART RATE: 55 BPM | TEMPERATURE: 98.1 F | OXYGEN SATURATION: 97 %

## 2022-02-24 DIAGNOSIS — S52.602D UNSPECIFIED FRACTURE OF LOWER END OF LEFT ULNA, SUBSEQUENT ENCOUNTER FOR CLOSED FRACTURE WITH ROUTINE HEALING: ICD-10-CM

## 2022-02-24 PROCEDURE — 99213 OFFICE O/P EST LOW 20 MIN: CPT

## 2022-02-24 NOTE — ASSESSMENT
[FreeTextEntry1] : 61 y.o. woman with multiple medical comorbidities now with distal radius and ulnar non-displaced fracture

## 2022-02-24 NOTE — PHYSICAL EXAM
[No Acute Distress] : no acute distress [Well Nourished] : well nourished [Well Developed] : well developed [Well-Appearing] : well-appearing [Normal Sclera/Conjunctiva] : normal sclera/conjunctiva [EOMI] : extraocular movements intact [No Respiratory Distress] : no respiratory distress  [No Accessory Muscle Use] : no accessory muscle use [Clear to Auscultation] : lungs were clear to auscultation bilaterally [Normal Rate] : normal rate  [Regular Rhythm] : with a regular rhythm [Normal S1, S2] : normal S1 and S2 [No Murmur] : no murmur heard [No Edema] : there was no peripheral edema [Speech Grossly Normal] : speech grossly normal [Memory Grossly Normal] : memory grossly normal [Normal Affect] : the affect was normal [Alert and Oriented x3] : oriented to person, place, and time [Normal Mood] : the mood was normal [Normal Insight/Judgement] : insight and judgment were intact [de-identified] : Patient wearing protective face mask  [de-identified] : Left wrist soft cast in place.

## 2022-02-24 NOTE — HISTORY OF PRESENT ILLNESS
[FreeTextEntry8] : Patient reports falling while walking up the steps 2 weeks ago. She was seen at the urgent care and imaging study showed a non-displaced fracture. A soft cast was placed, however, she states that she has not been seen by an orthopedic surgeon as recommended by the urgent care.\par \par Patient reports WALKER and leg pain with exertion. She also reports lower extremity edema.

## 2022-03-03 ENCOUNTER — APPOINTMENT (OUTPATIENT)
Dept: ORTHOPEDIC SURGERY | Facility: CLINIC | Age: 62
End: 2022-03-03
Payer: MEDICAID

## 2022-03-03 VITALS
SYSTOLIC BLOOD PRESSURE: 155 MMHG | HEIGHT: 63 IN | HEART RATE: 60 BPM | DIASTOLIC BLOOD PRESSURE: 96 MMHG | BODY MASS INDEX: 24.27 KG/M2 | WEIGHT: 137 LBS

## 2022-03-03 PROCEDURE — 25600 CLTX DST RDL FX/EPHYS SEP WO: CPT | Mod: LT

## 2022-03-03 PROCEDURE — 73110 X-RAY EXAM OF WRIST: CPT | Mod: LT

## 2022-03-03 PROCEDURE — 99204 OFFICE O/P NEW MOD 45 MIN: CPT | Mod: 57

## 2022-03-29 ENCOUNTER — APPOINTMENT (OUTPATIENT)
Dept: CARDIOLOGY | Facility: CLINIC | Age: 62
End: 2022-03-29

## 2022-03-29 NOTE — HISTORY OF PRESENT ILLNESS
[FreeTextEntry1] : Patient is a 62yo F with CAD s/p CABG,. MR, severe TR here for cardiac follow up. Doing fairly well, denies CP/SOB. Patient denies PND/orthopnea/edema/palpitations/syncope/claudication. Advised sleep study after last OV. Valsartan also added for HTN at last OV. Getting severe leg pain when going up stairs, worse in calves. FEels burning. No back pain. ALso worse with walking, has been less active. BP has been high and taking VAlsartan now bid. \par \par Looking to move down to [place] soon, delayed related to pandemic and may stay here now. STill finalizing divorce paperwork, filed she states. 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 Living with cousin now, not working.  \par \par ROS: GI and  negative

## 2022-03-30 ENCOUNTER — APPOINTMENT (OUTPATIENT)
Dept: ORTHOPEDIC SURGERY | Facility: CLINIC | Age: 62
End: 2022-03-30

## 2022-03-31 ENCOUNTER — APPOINTMENT (OUTPATIENT)
Dept: ORTHOPEDIC SURGERY | Facility: CLINIC | Age: 62
End: 2022-03-31

## 2022-04-07 ENCOUNTER — APPOINTMENT (OUTPATIENT)
Dept: INTERNAL MEDICINE | Facility: CLINIC | Age: 62
End: 2022-04-07
Payer: MEDICAID

## 2022-04-07 VITALS
BODY MASS INDEX: 23.39 KG/M2 | DIASTOLIC BLOOD PRESSURE: 70 MMHG | HEART RATE: 79 BPM | SYSTOLIC BLOOD PRESSURE: 100 MMHG | HEIGHT: 63 IN | TEMPERATURE: 97.7 F | WEIGHT: 132 LBS | OXYGEN SATURATION: 97 %

## 2022-04-07 PROCEDURE — 99213 OFFICE O/P EST LOW 20 MIN: CPT | Mod: 25

## 2022-04-07 NOTE — HISTORY OF PRESENT ILLNESS
[FreeTextEntry8] :  Patient came with c/o worsen of stool incontinence, as per patient she has been having bowel movements up 12 times per day, lose stool  for 5 days, as per patient there is no blood, but stool is dark\par  As per patient she has similar problems 12 years ago\par Patient is scheduled for EGD and colonoscopy in 04/25/2022

## 2022-04-08 LAB
BASOPHILS # BLD AUTO: 0.06 K/UL
BASOPHILS NFR BLD AUTO: 1.1 %
EOSINOPHIL # BLD AUTO: 0.25 K/UL
EOSINOPHIL NFR BLD AUTO: 4.4 %
HCT VFR BLD CALC: 34.3 %
HGB BLD-MCNC: 10.5 G/DL
IMM GRANULOCYTES NFR BLD AUTO: 0.2 %
IRON SERPL-MCNC: 64 UG/DL
LYMPHOCYTES # BLD AUTO: 1.55 K/UL
LYMPHOCYTES NFR BLD AUTO: 27.2 %
MAN DIFF?: NORMAL
MCHC RBC-ENTMCNC: 29.2 PG
MCHC RBC-ENTMCNC: 30.6 GM/DL
MCV RBC AUTO: 95.3 FL
MONOCYTES # BLD AUTO: 0.39 K/UL
MONOCYTES NFR BLD AUTO: 6.9 %
NEUTROPHILS # BLD AUTO: 3.43 K/UL
NEUTROPHILS NFR BLD AUTO: 60.2 %
PLATELET # BLD AUTO: 367 K/UL
RBC # BLD: 3.6 M/UL
RBC # FLD: 13.7 %
WBC # FLD AUTO: 5.69 K/UL

## 2022-04-14 ENCOUNTER — APPOINTMENT (OUTPATIENT)
Dept: CARDIOLOGY | Facility: CLINIC | Age: 62
End: 2022-04-14
Payer: MEDICAID

## 2022-04-14 ENCOUNTER — NON-APPOINTMENT (OUTPATIENT)
Age: 62
End: 2022-04-14

## 2022-04-14 VITALS
OXYGEN SATURATION: 97 % | SYSTOLIC BLOOD PRESSURE: 100 MMHG | HEART RATE: 68 BPM | WEIGHT: 136 LBS | BODY MASS INDEX: 24.1 KG/M2 | DIASTOLIC BLOOD PRESSURE: 60 MMHG | RESPIRATION RATE: 16 BRPM | HEIGHT: 63 IN

## 2022-04-14 PROCEDURE — 99214 OFFICE O/P EST MOD 30 MIN: CPT

## 2022-04-14 PROCEDURE — 93000 ELECTROCARDIOGRAM COMPLETE: CPT

## 2022-04-14 NOTE — DISCUSSION/SUMMARY
[FreeTextEntry1] : Patient is a 60yo F with CAD s/p CABG, MR, TR here for cardiac follow up and pre-op evaluation.\par -6/2021 nuclear stress test with good functional capacity and no ischemia, EF 75%\par -Echo with Normal LV/RV function, mild to moderate MR and severe TR (appears functional from annular dilatation), LAE/CARMEN and mild RVE. Chamber dimensions have not changed. good exercise tolerance during stress testing. No right sided failure signs. Does have symptoms to suggest significant MADDI and this could be contributing.  Will continue surveillance TR and eval for MADDI\par -Normal SALVADOR/PVR 2/2022\par \par \par 1. Continue surveillance of MR/TR, still needs  sleep study due to significant snoring, some daytime somnolence and signs RVE which is leading to TR. Still needs to schedule for after procedures\par 2. Continue ASA 81mg daily , may hold 1 week prior to EGD/colonoscopy and restart post\par 3. Continue medical management of CAD , continue statin \par 4. Continue current antihypertensives, blood pressure is well controlled \par 5. Patient is at low cardiovascular risk for EGD/colonoscopy\par 6. Follow up 4 months, echo then to evaluate MR/TR

## 2022-04-14 NOTE — HISTORY OF PRESENT ILLNESS
[FreeTextEntry1] : Patient is a 62yo F with CAD s/p CABG,. MR, severe TR here for cardiac follow up and prior to colonoscopy. Doing fairly well, denies CP/SOB. Patient denies PND/orthopnea/edema/palpitations/syncope/claudication. Put on Valsartan/HTC last OV.  Had fall and broke left wrist. In need of EGD/colonoscopy. \par \par Looking to move down to [place] soon, delayed related to pandemic and may stay here now. STill finalizing divorce paperwork, filed she states. 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 \par \par ROS: having dark stools and some incontinence, also trouble swallowing and  negative

## 2022-04-14 NOTE — ASSESSMENT
[FreeTextEntry1] : ECG: SR, LAE, septal Q waves (unchanged) \par \par  HDL 67n LDL 66  (1/2022) \par  HDL 66 LDL 85  (7/2019) \par \par SALVADOR/PVR 2/2022:\par 1. R 1.04 L 1.06\par 2. Normal PVR \par \par Exercise Nuclear Stress Test 6/2021:\par 1. Negative for ischemia/infarct, EF \par 2. Achieved 9 minutes, 10 METS\par 3. BP hypertensive at baseline, normal response \par 4. Hyperdynamic LV, EF 75%\par 5. DTS = 4\par \par ECHO 4/2021:\par 1. NOrmal LV size and function, EF 55-60%\par 2. Moderately Hypo basal inferior wall \par 3. Mild RVE with normal function\par 4. Moderate LAE, mild CARMEN\par 5. Mild to moderate MR\par 6. Severe TR\par \par ECHO 10/2019:\par 1. Normal LV size and function, EF 60%\par 2. Equivocal diastolic parameters\par 3. RVE with  normal function\par 4. Moderate LAE/CARMEN\par 5. Mild to moderate MR\par 6. Moderate to severe TR, RVSP 52mmHg\par 7. RVVO \par \par EX NUCLEAR STRESS TEST 9/2018:\par 1. Negative for ischemia, EF 81%\par 2. PVCs\par 3. Normal BP response

## 2022-04-22 ENCOUNTER — TRANSCRIPTION ENCOUNTER (OUTPATIENT)
Age: 62
End: 2022-04-22

## 2022-04-22 ENCOUNTER — APPOINTMENT (OUTPATIENT)
Dept: ORTHOPEDIC SURGERY | Facility: CLINIC | Age: 62
End: 2022-04-22
Payer: MEDICAID

## 2022-04-22 VITALS
DIASTOLIC BLOOD PRESSURE: 64 MMHG | WEIGHT: 136 LBS | HEART RATE: 65 BPM | HEIGHT: 63 IN | BODY MASS INDEX: 24.1 KG/M2 | SYSTOLIC BLOOD PRESSURE: 90 MMHG

## 2022-04-22 DIAGNOSIS — S52.615D NONDISPLACED FRACTURE OF LEFT ULNA STYLOID PROCESS, SUBSEQUENT ENCOUNTER FOR CLOSED FRACTURE WITH ROUTINE HEALING: ICD-10-CM

## 2022-04-22 DIAGNOSIS — S52.532D COLLES' FRACTURE OF LEFT RADIUS, SUBSEQUENT ENCOUNTER FOR CLOSED FRACTURE WITH ROUTINE HEALING: ICD-10-CM

## 2022-04-22 PROCEDURE — 99024 POSTOP FOLLOW-UP VISIT: CPT

## 2022-04-22 PROCEDURE — 73110 X-RAY EXAM OF WRIST: CPT | Mod: LT

## 2022-04-25 ENCOUNTER — APPOINTMENT (OUTPATIENT)
Dept: INTERNAL MEDICINE | Facility: AMBULATORY MEDICAL SERVICES | Age: 62
End: 2022-04-25
Payer: MEDICAID

## 2022-04-25 PROCEDURE — 45380 COLONOSCOPY AND BIOPSY: CPT | Mod: PT

## 2022-04-25 PROCEDURE — 43239 EGD BIOPSY SINGLE/MULTIPLE: CPT | Mod: 59

## 2022-05-06 ENCOUNTER — NON-APPOINTMENT (OUTPATIENT)
Age: 62
End: 2022-05-06

## 2022-05-09 ENCOUNTER — APPOINTMENT (OUTPATIENT)
Dept: INTERNAL MEDICINE | Facility: CLINIC | Age: 62
End: 2022-05-09
Payer: MEDICAID

## 2022-05-09 VITALS
OXYGEN SATURATION: 97 % | HEIGHT: 63 IN | WEIGHT: 130 LBS | DIASTOLIC BLOOD PRESSURE: 77 MMHG | TEMPERATURE: 98 F | HEART RATE: 67 BPM | SYSTOLIC BLOOD PRESSURE: 136 MMHG | BODY MASS INDEX: 23.04 KG/M2

## 2022-05-09 DIAGNOSIS — K29.70 GASTRITIS, UNSPECIFIED, W/OUT BLEEDING: ICD-10-CM

## 2022-05-09 DIAGNOSIS — K90.89 OTHER INTESTINAL MALABSORPTION: ICD-10-CM

## 2022-05-09 PROCEDURE — 99213 OFFICE O/P EST LOW 20 MIN: CPT

## 2022-05-09 NOTE — PHYSICAL EXAM
[General Appearance - Alert] : alert [General Appearance - In No Acute Distress] : in no acute distress [Sclera] : the sclera and conjunctiva were normal [PERRL With Normal Accommodation] : pupils were equal in size, round, and reactive to light [Extraocular Movements] : extraocular movements were intact [Outer Ear] : the ears and nose were normal in appearance [Oropharynx] : the oropharynx was normal [Neck Appearance] : the appearance of the neck was normal [Neck Cervical Mass (___cm)] : no neck mass was observed [Jugular Venous Distention Increased] : there was no jugular-venous distention [Thyroid Diffuse Enlargement] : the thyroid was not enlarged [Thyroid Nodule] : there were no palpable thyroid nodules [Auscultation Breath Sounds / Voice Sounds] : lungs were clear to auscultation bilaterally [Bowel Sounds] : normal bowel sounds [Abdomen Soft] : soft [Abdomen Tenderness] : non-tender [Abdomen Mass (___ Cm)] : no abdominal mass palpated [Occult Blood Positive] : stool was negative for occult blood [Cervical Lymph Nodes Enlarged Posterior Bilaterally] : posterior cervical [Cervical Lymph Nodes Enlarged Anterior Bilaterally] : anterior cervical [Supraclavicular Lymph Nodes Enlarged Bilaterally] : supraclavicular [Axillary Lymph Nodes Enlarged Bilaterally] : axillary [Femoral Lymph Nodes Enlarged Bilaterally] : femoral [Inguinal Lymph Nodes Enlarged Bilaterally] : inguinal [No CVA Tenderness] : no ~M costovertebral angle tenderness [No Spinal Tenderness] : no spinal tenderness [Abnormal Walk] : normal gait [Nail Clubbing] : no clubbing  or cyanosis of the fingernails [Musculoskeletal - Swelling] : no joint swelling seen [Motor Tone] : muscle strength and tone were normal [Skin Color & Pigmentation] : normal skin color and pigmentation [Skin Turgor] : normal skin turgor [] : no rash [Deep Tendon Reflexes (DTR)] : deep tendon reflexes were 2+ and symmetric [Sensation] : the sensory exam was normal to light touch and pinprick [No Focal Deficits] : no focal deficits [Oriented To Time, Place, And Person] : oriented to person, place, and time [Impaired Insight] : insight and judgment were intact [Affect] : the affect was normal

## 2022-05-09 NOTE — HISTORY OF PRESENT ILLNESS
[FreeTextEntry1] : Father passed away age 79 esophageal cancer. started 1 year prior. \par Bothy parents had crohns disease. \par  She has a problem with food sticking in her esophagus- has been going on for 1 year\par She had MI 9 years ago and is s/p CABG x1 9 years \par she sees a cardiologist .  \par she also gets midepigastric pain that is helped with tums. \par Her BM- are urgent at times. - last colonoscopy was many years ago. 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 \par  And now is going through divorce. \par  problems with dairy - and most times has urgency to Have BM \par  had colon and EGD all biopsies normal including T.I.\par  she is s/p cholecystectomy 12 years ago.

## 2022-05-12 ENCOUNTER — RX RENEWAL (OUTPATIENT)
Age: 62
End: 2022-05-12

## 2022-05-19 ENCOUNTER — NON-APPOINTMENT (OUTPATIENT)
Age: 62
End: 2022-05-19

## 2022-06-07 ENCOUNTER — APPOINTMENT (OUTPATIENT)
Dept: ORTHOPEDIC SURGERY | Facility: CLINIC | Age: 62
End: 2022-06-07

## 2022-07-18 ENCOUNTER — APPOINTMENT (OUTPATIENT)
Dept: INTERNAL MEDICINE | Facility: CLINIC | Age: 62
End: 2022-07-18

## 2022-07-24 NOTE — ED ADULT TRIAGE NOTE - NS ED NURSE BANDS TYPE
[facility]  Initial Interdisciplinary Treatment Plan NO      Original treatment plan Date & Time: 7/24/2022 0911    Admission Type:  Admission Type: Voluntary    Reason for admission:   Reason for Admission: Patient voluntary from [facility] with suicidal ideation to jump off a bridge. Estimated Length of Stay:  5-7days  Estimated Discharge Date: to be determined by physician    PATIENT STRENGTHS:  Patient Strengths:   Patient Strengths and Limitations:   Addictive Behavior: Addictive Behavior  In the Past 3 Months, Have You Felt or Has Someone Told You That You Have a Problem With  : None  Medical Problems:  Past Medical History:   Diagnosis Date    Bradycardia     [facility] consulted during hospital admission. COVID-19     1/2022 per patient. Was tested at a homeless shelter and was positive. States no symptoms. Depression with suicidal ideation 04/2022 April 2022 Psych ding [facility] then to [place] for psych. Glaucoma     Homeless single person     lives in non-mobile [facility] in [facility] \"behind a garage\"    Hypertension     Legally blind     lt eye completely blind, rt eye has about 20 % vision  Sees [facility] and then \"glaucoma specialist\" Elsa Barron Dr. [facility]  \"Dr. Soren Hayden"    Mixed hyperlipidemia 3/28/2022    Pancreatitis     4/2022  [facility] admit    Snores     Wellness examination     Dr. King Martell last seen \"years ago\" Michaelle Dacosta to [employer] in [facility] currently . Pt. homeless.       Status EXAM:Mental Status and Behavioral Exam  Normal: No  Level of Assistance: Independent/Self  Facial Expression: Flat  Affect: Blunt  Level of Consciousness: Alert  Frequency of Checks: 4 times per hour, close  Mood:Normal: No  Mood: Depressed, Anxious  Motor Activity:Normal: No  Motor Activity: Decreased  Eye Contact: Fair  Observed Behavior: Preoccupied, Cooperative  Sexual Misconduct History: Current - no  Preception: [place] to person, [place] to time, [place] to place, [place] to situation  Attention:Normal: No  Attention: Distractible  Thought Processes: Circumstantial  Thought Content:Normal: No  Thought Content: Preoccupations  Depression Symptoms: Feelings of hopelessess, Feelings of helplessness, Impaired concentration  Anxiety Symptoms: Generalized  Nancy Symptoms: No problems reported or observed.   Hallucinations: None  Delusions: No  Memory:Normal: Yes  Insight and Judgment: No  Insight and Judgment: Poor judgment, Poor insight, Unmotivated    EDUCATION:   Learner Progress Toward Treatment Goals: reviewed group plans and strategies for care    Method:group therapy, medication compliance, individualized assessments and care planning    Outcome: needs reinforcement    PATIENT GOALS: to be discussed with patient within 72 hours    PLAN/TREATMENT RECOMMENDATIONS:     continue group therapy , medications compliance, goal setting, individualized assessments and care, continue to monitor pt on unit      SHORT-TERM GOALS:   Time frame for Short-Term Goals: 5-7 days    LONG-TERM GOALS:  Time frame for Long-Term Goals: 6 months  Members Present in Team Meeting: See Signature Sheet    NICK Swartz Name band;

## 2022-08-10 ENCOUNTER — APPOINTMENT (OUTPATIENT)
Dept: CARDIOLOGY | Facility: CLINIC | Age: 62
End: 2022-08-10

## 2022-08-10 ENCOUNTER — APPOINTMENT (OUTPATIENT)
Dept: INTERNAL MEDICINE | Facility: CLINIC | Age: 62
End: 2022-08-10

## 2022-09-07 ENCOUNTER — APPOINTMENT (OUTPATIENT)
Dept: CARDIOLOGY | Facility: CLINIC | Age: 62
End: 2022-09-07

## 2022-09-08 ENCOUNTER — APPOINTMENT (OUTPATIENT)
Dept: CARDIOLOGY | Facility: CLINIC | Age: 62
End: 2022-09-08

## 2022-09-12 ENCOUNTER — APPOINTMENT (OUTPATIENT)
Dept: INTERNAL MEDICINE | Facility: CLINIC | Age: 62
End: 2022-09-12

## 2022-10-17 ENCOUNTER — RX RENEWAL (OUTPATIENT)
Age: 62
End: 2022-10-17

## 2022-10-20 ENCOUNTER — APPOINTMENT (OUTPATIENT)
Dept: ORTHOPEDIC SURGERY | Facility: CLINIC | Age: 62
End: 2022-10-20
Payer: MEDICAID

## 2022-10-20 VITALS — WEIGHT: 130 LBS | BODY MASS INDEX: 23.04 KG/M2 | HEIGHT: 63 IN

## 2022-10-20 DIAGNOSIS — Z98.890 OTHER SPECIFIED POSTPROCEDURAL STATES: ICD-10-CM

## 2022-10-20 DIAGNOSIS — M47.812 SPONDYLOSIS W/OUT MYELOPATHY OR RADICULOPATHY, CERVICAL REGION: ICD-10-CM

## 2022-10-20 DIAGNOSIS — M19.012 PRIMARY OSTEOARTHRITIS, LEFT SHOULDER: ICD-10-CM

## 2022-10-20 PROCEDURE — 72040 X-RAY EXAM NECK SPINE 2-3 VW: CPT

## 2022-10-20 PROCEDURE — 99215 OFFICE O/P EST HI 40 MIN: CPT

## 2022-10-20 PROCEDURE — 73030 X-RAY EXAM OF SHOULDER: CPT | Mod: RT

## 2022-10-20 RX ORDER — MULTIVITAMIN
TABLET ORAL DAILY
Qty: 90 | Refills: 3 | Status: ACTIVE | COMMUNITY

## 2022-10-20 RX ORDER — BUPRENORPHINE HYDROCHLORIDE, NALOXONE HYDROCHLORIDE 8; 2 MG/1; MG/1
8-2 FILM, SOLUBLE BUCCAL; SUBLINGUAL
Refills: 0 | Status: ACTIVE | COMMUNITY

## 2022-10-20 RX ORDER — GABAPENTIN 800 MG/1
800 TABLET, COATED ORAL 3 TIMES DAILY
Refills: 0 | Status: ACTIVE | COMMUNITY
Start: 2021-04-07

## 2022-10-20 NOTE — PHYSICAL EXAM
[Left] : left shoulder [Right] : right shoulder [5 ___] : forward flexion 5[unfilled]/5 [5___] : internal rotation 5[unfilled]/5 [] : motor and sensory intact distally [de-identified] : extension 10 degrees [FreeTextEntry1] : left shoulder severe gh djd, right reverse tsa well fixed. [TWNoteComboBox7] : active forward flexion 90 degrees [TWNoteComboBox6] : internal rotation L5 [de-identified] : external rotation 30 degrees

## 2022-10-20 NOTE — HISTORY OF PRESENT ILLNESS
[10] : 10 [Dull/Aching] : dull/aching [Radiating] : radiating [Shooting] : shooting [Constant] : constant [Retired] : Work status: retired [] : Post Surgical Visit: no [FreeTextEntry1] : Jose shoulder [FreeTextEntry5] : Patient complains of right shoulder pain since 1 week ago, no injury, left has been hurting for multiple months. \par h/o right shoulder replacement by dr ahuja 5 years ago. no rash or fevers noted. taking tylenol arthritis and aleve once a day.  cc is pain in right deltoid. last seen by dr chowdhury 4 years ago told to f/u prn [de-identified] : movement

## 2022-10-20 NOTE — ASSESSMENT
[FreeTextEntry1] : will do medrol and therapy and see spine md for surgical eval. right shoulder shell see dr ahuja, implant looks stable. cc seems to be neck today

## 2022-10-30 ENCOUNTER — APPOINTMENT (OUTPATIENT)
Dept: INTERNAL MEDICINE | Facility: CLINIC | Age: 62
End: 2022-10-30

## 2022-10-31 ENCOUNTER — NON-APPOINTMENT (OUTPATIENT)
Age: 62
End: 2022-10-31

## 2022-10-31 DIAGNOSIS — J20.8 COVID-19: ICD-10-CM

## 2022-10-31 DIAGNOSIS — U07.1 COVID-19: ICD-10-CM

## 2022-11-04 DIAGNOSIS — R05.9 COUGH, UNSPECIFIED: ICD-10-CM

## 2022-12-14 ENCOUNTER — APPOINTMENT (OUTPATIENT)
Dept: INTERNAL MEDICINE | Facility: CLINIC | Age: 62
End: 2022-12-14

## 2022-12-14 ENCOUNTER — LABORATORY RESULT (OUTPATIENT)
Age: 62
End: 2022-12-14

## 2022-12-14 VITALS
HEART RATE: 71 BPM | OXYGEN SATURATION: 97 % | TEMPERATURE: 98.1 F | WEIGHT: 137.4 LBS | BODY MASS INDEX: 24.34 KG/M2 | SYSTOLIC BLOOD PRESSURE: 129 MMHG | HEIGHT: 63 IN | DIASTOLIC BLOOD PRESSURE: 80 MMHG

## 2022-12-14 DIAGNOSIS — F17.200 NICOTINE DEPENDENCE, UNSPECIFIED, UNCOMPLICATED: ICD-10-CM

## 2022-12-14 DIAGNOSIS — Z12.31 ENCOUNTER FOR SCREENING MAMMOGRAM FOR MALIGNANT NEOPLASM OF BREAST: ICD-10-CM

## 2022-12-14 DIAGNOSIS — R19.7 DIARRHEA, UNSPECIFIED: ICD-10-CM

## 2022-12-14 DIAGNOSIS — Z23 ENCOUNTER FOR IMMUNIZATION: ICD-10-CM

## 2022-12-14 DIAGNOSIS — K25.9 GASTRIC ULCER, UNSPECIFIED AS ACUTE OR CHRONIC, W/OUT HEMORRHAGE OR PERFORATION: ICD-10-CM

## 2022-12-14 PROCEDURE — G0008: CPT

## 2022-12-14 PROCEDURE — 99406 BEHAV CHNG SMOKING 3-10 MIN: CPT | Mod: 25

## 2022-12-14 PROCEDURE — 99214 OFFICE O/P EST MOD 30 MIN: CPT | Mod: 25

## 2022-12-14 PROCEDURE — 90686 IIV4 VACC NO PRSV 0.5 ML IM: CPT

## 2022-12-14 NOTE — COUNSELING
[Fall prevention counseling provided] : Fall prevention counseling provided [Cessation strategies including cessation program discussed] : Cessation strategies including cessation program discussed [Use of nicotine replacement therapies and other medications discussed] : Use of nicotine replacement therapies and other medications discussed [Encouraged to pick a quit date and identify support needed to quit] : Encouraged to pick a quit date and identify support needed to quit [Smoking Cessation Program Referral] : Smoking Cessation Program Referral  [Yes] : Willing to quit smoking [Potential consequences of obesity discussed] : Potential consequences of obesity discussed [Benefits of weight loss discussed] : Benefits of weight loss discussed [Structured Weight Management Program suggested:] : Structured weight management program suggested [Encouraged to maintain food diary] : Encouraged to maintain food diary [Encouraged to increase physical activity] : Encouraged to increase physical activity [Encouraged to use exercise tracking device] : Encouraged to use exercise tracking device [Weigh Self Weekly] : weigh self weekly [Decrease Portions] : decrease portions [Keep Food Diary] : keep food diary [FreeTextEntry1] : 9

## 2022-12-14 NOTE — ASSESSMENT
[FreeTextEntry1] : Lab work will be done today and we will f/u with lab results \par Come back for f/u in 3-4 month

## 2022-12-14 NOTE — HISTORY OF PRESENT ILLNESS
[FreeTextEntry1] : Patient came with c/o feeling tired, Anxiety [de-identified] : Patient is 62 year female  with PMH of  HTN, Hyperlipidemia, , Disk herniation, Bipolar,  h/o Opioid dependents in the past, CAD- s/p MI ,  came today  with c/o feeling very tired, Anxiety problem\par  Wants try again Smoking cessation

## 2022-12-15 LAB
25(OH)D3 SERPL-MCNC: 50.8 NG/ML
ALBUMIN SERPL ELPH-MCNC: 4.5 G/DL
ALP BLD-CCNC: 175 U/L
ALT SERPL-CCNC: 21 U/L
ANION GAP SERPL CALC-SCNC: 15 MMOL/L
APPEARANCE: CLEAR
AST SERPL-CCNC: 33 U/L
BASOPHILS # BLD AUTO: 0.11 K/UL
BASOPHILS NFR BLD AUTO: 1.7 %
BILIRUB SERPL-MCNC: <0.2 MG/DL
BILIRUBIN URINE: NEGATIVE
BLOOD URINE: NEGATIVE
BUN SERPL-MCNC: 17 MG/DL
CALCIUM SERPL-MCNC: 9.4 MG/DL
CHLORIDE SERPL-SCNC: 105 MMOL/L
CHOLEST SERPL-MCNC: 185 MG/DL
CO2 SERPL-SCNC: 18 MMOL/L
COLOR: YELLOW
CREAT SERPL-MCNC: 0.8 MG/DL
EGFR: 84 ML/MIN/1.73M2
EOSINOPHIL # BLD AUTO: 0.37 K/UL
EOSINOPHIL NFR BLD AUTO: 5.7 %
ESTIMATED AVERAGE GLUCOSE: 108 MG/DL
FOLATE SERPL-MCNC: >20 NG/ML
GLUCOSE QUALITATIVE U: NEGATIVE
GLUCOSE SERPL-MCNC: 104 MG/DL
HBA1C MFR BLD HPLC: 5.4 %
HCT VFR BLD CALC: 34.7 %
HDLC SERPL-MCNC: 67 MG/DL
HGB BLD-MCNC: 11.1 G/DL
IMM GRANULOCYTES NFR BLD AUTO: 0.5 %
IRON SERPL-MCNC: 93 UG/DL
KETONES URINE: NEGATIVE
LDLC SERPL CALC-MCNC: 88 MG/DL
LEUKOCYTE ESTERASE URINE: ABNORMAL
LYMPHOCYTES # BLD AUTO: 1.5 K/UL
LYMPHOCYTES NFR BLD AUTO: 23.2 %
MAN DIFF?: NORMAL
MCHC RBC-ENTMCNC: 30.7 PG
MCHC RBC-ENTMCNC: 32 GM/DL
MCV RBC AUTO: 96.1 FL
MONOCYTES # BLD AUTO: 0.42 K/UL
MONOCYTES NFR BLD AUTO: 6.5 %
NEUTROPHILS # BLD AUTO: 4.03 K/UL
NEUTROPHILS NFR BLD AUTO: 62.4 %
NITRITE URINE: NEGATIVE
NONHDLC SERPL-MCNC: 118 MG/DL
PH URINE: 6.5
PLATELET # BLD AUTO: 319 K/UL
POTASSIUM SERPL-SCNC: 4.8 MMOL/L
PROT SERPL-MCNC: 7.2 G/DL
PROTEIN URINE: ABNORMAL
RBC # BLD: 3.61 M/UL
RBC # FLD: 14.3 %
SODIUM SERPL-SCNC: 138 MMOL/L
SPECIFIC GRAVITY URINE: 1.04
T3FREE SERPL-MCNC: 2.22 PG/ML
T4 FREE SERPL-MCNC: 0.7 NG/DL
TRIGL SERPL-MCNC: 153 MG/DL
TSH SERPL-ACNC: 0.7 UIU/ML
UROBILINOGEN URINE: NORMAL
VIT B12 SERPL-MCNC: 1731 PG/ML
WBC # FLD AUTO: 6.46 K/UL

## 2023-01-18 ENCOUNTER — RX RENEWAL (OUTPATIENT)
Age: 63
End: 2023-01-18

## 2023-01-25 ENCOUNTER — NON-APPOINTMENT (OUTPATIENT)
Age: 63
End: 2023-01-25

## 2023-02-06 ENCOUNTER — APPOINTMENT (OUTPATIENT)
Dept: INTERNAL MEDICINE | Facility: CLINIC | Age: 63
End: 2023-02-06
Payer: MEDICAID

## 2023-02-06 VITALS
SYSTOLIC BLOOD PRESSURE: 131 MMHG | HEIGHT: 63 IN | DIASTOLIC BLOOD PRESSURE: 84 MMHG | OXYGEN SATURATION: 98 % | WEIGHT: 135.4 LBS | HEART RATE: 74 BPM | TEMPERATURE: 97.7 F | BODY MASS INDEX: 23.99 KG/M2

## 2023-02-06 DIAGNOSIS — E67.8 OTHER SPECIFIED HYPERALIMENTATION: ICD-10-CM

## 2023-02-06 DIAGNOSIS — M79.673 PAIN IN UNSPECIFIED FOOT: ICD-10-CM

## 2023-02-06 PROCEDURE — 99214 OFFICE O/P EST MOD 30 MIN: CPT | Mod: 25

## 2023-02-06 NOTE — HISTORY OF PRESENT ILLNESS
[FreeTextEntry8] : Patient is 62 year female with PMH of HTN, Hyperlipidemia, , Disk herniation, Bipolar, h/o Opioid dependents in the past, CAD- s/p MI \par Came with c/o Left inguinal  area pain with radiation to left knee and b/l foot pain \par C/o epigastric pain currently on Omeprazole 40 mg Qd, h/o PUD, Last EGD was done in 2022 \par S/p EGD and Colonoscopy  in 04/2022- reviewed, Gastritis

## 2023-02-09 LAB
BASOPHILS # BLD AUTO: 0.11 K/UL
BASOPHILS NFR BLD AUTO: 1.7 %
CRP SERPL-MCNC: 6 MG/L
EOSINOPHIL # BLD AUTO: 0.43 K/UL
EOSINOPHIL NFR BLD AUTO: 6.5 %
ERYTHROCYTE [SEDIMENTATION RATE] IN BLOOD BY WESTERGREN METHOD: 22 MM/HR
FOLATE SERPL-MCNC: 10.3 NG/ML
HCT VFR BLD CALC: 35.1 %
HGB BLD-MCNC: 11.4 G/DL
IMM GRANULOCYTES NFR BLD AUTO: 0.5 %
LYMPHOCYTES # BLD AUTO: 2.15 K/UL
LYMPHOCYTES NFR BLD AUTO: 32.3 %
MAN DIFF?: NORMAL
MCHC RBC-ENTMCNC: 30.6 PG
MCHC RBC-ENTMCNC: 32.5 GM/DL
MCV RBC AUTO: 94.4 FL
MONOCYTES # BLD AUTO: 0.47 K/UL
MONOCYTES NFR BLD AUTO: 7.1 %
NEUTROPHILS # BLD AUTO: 3.46 K/UL
NEUTROPHILS NFR BLD AUTO: 51.9 %
PLATELET # BLD AUTO: 301 K/UL
RBC # BLD: 3.72 M/UL
RBC # FLD: 13.2 %
RHEUMATOID FACT SER QL: <10 IU/ML
T3FREE SERPL-MCNC: 3.45 PG/ML
T4 FREE SERPL-MCNC: 1 NG/DL
TSH SERPL-ACNC: 0.62 UIU/ML
VIT B12 SERPL-MCNC: >2000 PG/ML
WBC # FLD AUTO: 6.65 K/UL

## 2023-02-21 ENCOUNTER — NON-APPOINTMENT (OUTPATIENT)
Age: 63
End: 2023-02-21

## 2023-02-23 NOTE — ED ADULT NURSE NOTE - CHPI ED NUR TIMING2
pt was seen at the urgent care this morning and had blood work done and was told her D dimer is high and she needs to come to the ER for a CT scan
sudden onset

## 2023-04-30 ENCOUNTER — NON-APPOINTMENT (OUTPATIENT)
Age: 63
End: 2023-04-30

## 2023-05-08 ENCOUNTER — APPOINTMENT (OUTPATIENT)
Dept: INTERNAL MEDICINE | Facility: CLINIC | Age: 63
End: 2023-05-08
Payer: MEDICAID

## 2023-05-08 VITALS
WEIGHT: 131 LBS | DIASTOLIC BLOOD PRESSURE: 65 MMHG | OXYGEN SATURATION: 97 % | HEART RATE: 69 BPM | SYSTOLIC BLOOD PRESSURE: 101 MMHG | BODY MASS INDEX: 23.21 KG/M2 | HEIGHT: 63 IN

## 2023-05-08 DIAGNOSIS — M25.552 PAIN IN LEFT HIP: ICD-10-CM

## 2023-05-08 DIAGNOSIS — G89.29 OTHER CHRONIC PAIN: ICD-10-CM

## 2023-05-08 PROCEDURE — 99214 OFFICE O/P EST MOD 30 MIN: CPT

## 2023-05-08 NOTE — HISTORY OF PRESENT ILLNESS
[FreeTextEntry8] : Patient is 62 year female with PMH of HTN, Hyperlipidemia, , Disk herniation, Bipolar, h/o Opioid dependents in the past, CAD- s/p MI \par Came with c/o Left inguinal  area pain with radiation to left knee and b/l foot pain \par Found to have low BP reading

## 2023-05-12 ENCOUNTER — APPOINTMENT (OUTPATIENT)
Dept: ORTHOPEDIC SURGERY | Facility: CLINIC | Age: 63
End: 2023-05-12
Payer: MEDICAID

## 2023-05-12 VITALS — DIASTOLIC BLOOD PRESSURE: 62 MMHG | SYSTOLIC BLOOD PRESSURE: 109 MMHG | HEART RATE: 76 BPM

## 2023-05-12 VITALS — WEIGHT: 128 LBS | BODY MASS INDEX: 22.68 KG/M2 | HEIGHT: 63 IN

## 2023-05-12 PROCEDURE — 99214 OFFICE O/P EST MOD 30 MIN: CPT

## 2023-05-12 NOTE — PHYSICAL EXAM
[de-identified] : General Appearance / Station: Well developed, well nourished, in no acute distress\par Orientation: Oriented to person, place, and time\par Gait & Station: Ambulates without assistive device\par Neurologic: Normal leg sensation\par Cardiovascular: Warm extremity\par Lymphatics: No lymphedema\par Generalized Ligament Laxity: Normal\par Stiffness: Normal.\par \par LUMBAR SPINE\par Nontender at lumbar spine\par Straight leg raise: Negative\par Motor: 5/5 motor L2-S1\par Sensation Intact. No paresthesias L2-S1\par \par SYMPTOMATIC LEFT HIP\par Range of motion: PAINFUL internal and external rotation of the hip.  Limited internal rotation\par Strength: Within Normal Limits. Negative Trendelenburg sign                                                                     \par FADIR: POSITIVE\par Stinchfield: POSITIVE, with groin pain\par Palpation: TENDER at greater trochanter, Nontender SI joint                  \par \par LEFT KNEE\par Alignment: Varus\par Skin: Normal. \par Effusion: None\par Quadriceps: Normal\par Range of motion: Normal\par PF crepitus: 1+\par PF apprehension: None\par Patella / Patella Tendon: None\par Palpation: Nontender\par Meniscus signs: Negative\par \par ASYMPTOMATIC RIGHT HIP\par Range of motion: Painless internal and external rotation of the hip.\par Skin: Normal\par Strength: Within normal limits\par ENOC: Negative\par FADIR: Negative\par Stinchfield: Negative\par Palpation: Nontender[TENDER] at greater trochanter, Nontender[TENDER] at SI joint\par  [de-identified] : Previous x-rays from [facility] radiology reviewed which show AP pelvis with end-stage of arthritis of the left hip and osteopenia.  4 views of the lumbar spine show grade 1 anterolisthesis of L4-5 with degenerative disc disease at health 4 5 and L5-S1.  There is no mobility on flexion-extension views

## 2023-05-12 NOTE — HISTORY OF PRESENT ILLNESS
[de-identified] : DUKE GARCIA  is an 62 year-old female presents today with a chief complaint of left hip pain. Patient describes onset over the last number of months, but it may be going on for years.  She has a history of coronary artery disease status post CABG 12 years ago.  She has a history of previous alcohol and drug abuse and has been sober for 12 years.  She currently takes medication only for hypertension.  She is working on smoking cessation and is just recently stopped and taking a Nicorette patch\par \par Patient points to the hip, specifically in the groin aspect as maximum point of tenderness. Pain is typically 10/10 in severity, dull and achy but sometimes sharp in quality with certain activities. Symptoms are exacerbated by activity, or even walking/standing. They are improved with rest and modifications of daily routines. Patient denies any radiation of symptoms beyond the surrounding area. Knee symptoms appear to be largely unrelated.  She does have chronic back pain but this is distinctly different pain\par \par To address the pathology, they have tried OTC medications/NSAIDs with some relief, even though short lasting.  Exercise therapy actually makes things worse but may have allowed greater than 50 % range of motion. Putting on shoes and other similar activities of daily living are difficult. Things have gotten bad enough that patient will need assistive devices like the banister for going up and down stairs. They may need a cane. \par \par At this point the patient is quite frustrated by their condition. As duration of symptoms exceeds months, they are here for further evaluation and discussion of possible diagnoses and management. They deny any further trauma. No fever or chills, no signs of infection. Today, patient does not state any other associated signs or complains outside of those described. \par \par A complete review of symptoms as well as past medical/surgical history, medications, allergies, social and family history, and other details of HPI and exam were reviewed per first visit intake form and updated accordingly. Additional and more relevant details are noted in further detail today. \par \par

## 2023-05-18 ENCOUNTER — NON-APPOINTMENT (OUTPATIENT)
Age: 63
End: 2023-05-18

## 2023-06-12 ENCOUNTER — APPOINTMENT (OUTPATIENT)
Dept: PULMONOLOGY | Facility: CLINIC | Age: 63
End: 2023-06-12
Payer: MEDICAID

## 2023-06-12 VITALS
HEIGHT: 63 IN | BODY MASS INDEX: 22.32 KG/M2 | TEMPERATURE: 98 F | DIASTOLIC BLOOD PRESSURE: 62 MMHG | OXYGEN SATURATION: 94 % | SYSTOLIC BLOOD PRESSURE: 96 MMHG | HEART RATE: 70 BPM | WEIGHT: 126 LBS

## 2023-06-12 DIAGNOSIS — R06.83 SNORING: ICD-10-CM

## 2023-06-12 PROCEDURE — 99204 OFFICE O/P NEW MOD 45 MIN: CPT

## 2023-06-12 RX ORDER — LAMOTRIGINE 100 MG/1
100 TABLET ORAL
Refills: 0 | Status: ACTIVE | COMMUNITY

## 2023-06-12 RX ORDER — ESCITALOPRAM OXALATE 10 MG/1
10 TABLET ORAL
Refills: 0 | Status: ACTIVE | COMMUNITY
Start: 2021-12-29

## 2023-06-12 RX ORDER — METHYLPREDNISOLONE 4 MG/1
4 TABLET ORAL
Qty: 1 | Refills: 1 | Status: COMPLETED | COMMUNITY
Start: 2022-10-20 | End: 2023-06-12

## 2023-06-12 RX ORDER — METOPROLOL SUCCINATE 25 MG/1
25 TABLET, EXTENDED RELEASE ORAL
Refills: 0 | Status: ACTIVE | COMMUNITY

## 2023-06-12 RX ORDER — BENZONATATE 100 MG/1
100 CAPSULE ORAL
Qty: 30 | Refills: 0 | Status: COMPLETED | COMMUNITY
Start: 2022-11-04 | End: 2023-06-12

## 2023-06-12 RX ORDER — ASPIRIN 81 MG
81 TABLET, DELAYED RELEASE (ENTERIC COATED) ORAL
Refills: 0 | Status: COMPLETED | COMMUNITY
End: 2023-06-12

## 2023-06-12 RX ORDER — METOPROLOL SUCCINATE 50 MG/1
50 TABLET, EXTENDED RELEASE ORAL
Qty: 90 | Refills: 1 | Status: COMPLETED | COMMUNITY
Start: 2018-05-10 | End: 2023-06-12

## 2023-06-12 RX ORDER — VALSARTAN AND HYDROCHLOROTHIAZIDE 160; 25 MG/1; MG/1
160-25 TABLET, FILM COATED ORAL
Qty: 30 | Refills: 0 | Status: COMPLETED | COMMUNITY
Start: 2023-01-18

## 2023-06-12 RX ORDER — AZITHROMYCIN 250 MG/1
250 TABLET, FILM COATED ORAL
Qty: 1 | Refills: 0 | Status: COMPLETED | COMMUNITY
Start: 2022-10-31 | End: 2023-06-12

## 2023-06-12 RX ORDER — ONDANSETRON 4 MG/1
4 TABLET, ORALLY DISINTEGRATING ORAL
Qty: 9 | Refills: 0 | Status: COMPLETED | COMMUNITY
Start: 2023-05-01

## 2023-06-12 NOTE — HISTORY OF PRESENT ILLNESS
[Snoring] : snoring [Awakes Unrefreshed] : awakening unrefreshed [Awakening With Dry Mouth] : awakening with dry mouth [Daytime Somnolence] : daytime somnolence [Frequent Nocturnal Awakening] : frequent nocturnal awakening [FreeTextEntry1] : Kell Cierrashantal a pleasant 62 year female with history of myocardial infarction, s/p CABG, and arthritis, she has been having snoring, excessive daytime hypersomnolence, dry mouth, and occasional headaches x 1 year, no chest pain or shortness of breath.  [Awakes with Headache] : no headache upon awakening [Recent  Weight Gain] : no recent weight gain

## 2023-06-12 NOTE — CONSULT LETTER
[Dear  ___] : Dear  [unfilled], [Courtesy Letter:] : I had the pleasure of seeing your patient, [unfilled], in my office today. [Please see my note below.] : Please see my note below. [Consult Closing:] : Thank you very much for allowing me to participate in the care of this patient.  If you have any questions, please do not hesitate to contact me. [Sincerely,] : Sincerely, [FreeTextEntry3] : Dr. She Ott

## 2023-06-12 NOTE — REASON FOR VISIT
[Consultation] : a consultation visit [FreeTextEntry1] : Dr. Pauline Torres [FreeTextEntry2] : Snoring

## 2023-06-12 NOTE — ASSESSMENT
[FreeTextEntry1] : Based on history and physical the patient has a high likelihood of having obstructive sleep apnea. Further assessment by sleep testing is recommended. There is no contraindication to a home sleep study. We will therefore proceed to two night home apnea sleep study for further assessment. \sonny Advised Kell to return for sleep follow-up after home sleep study has been performed.\par Medications reviewed. Continue present medications.\sonny Also advised her to closely follow with you clinically.

## 2023-06-12 NOTE — PHYSICAL EXAM
[Heart Rate And Rhythm] : heart rate was normal and rhythm regular [Heart Sounds] : normal S1 and S2 [Heart Sounds Gallop] : no gallops [Murmurs] : no murmurs [Heart Sounds Pericardial Friction Rub] : no pericardial rub [Auscultation Breath Sounds / Voice Sounds] : lungs were clear to auscultation bilaterally [General Appearance - Well Developed] : well developed [Normal Appearance] : normal appearance [Well Groomed] : well groomed [General Appearance - Well Nourished] : well nourished [No Deformities] : no deformities [General Appearance - In No Acute Distress] : no acute distress [Abnormal Walk] : normal gait [Musculoskeletal - Swelling] : no joint swelling seen [Motor Tone] : muscle strength and tone were normal [Nail Clubbing] : no clubbing of the fingernails [Cyanosis, Localized] : no localized cyanosis [Petechial Hemorrhages (___cm)] : no petechial hemorrhages [Skin Color & Pigmentation] : normal skin color and pigmentation [Skin Turgor] : normal skin turgor [] : no rash

## 2023-06-20 ENCOUNTER — APPOINTMENT (OUTPATIENT)
Dept: PULMONOLOGY | Facility: CLINIC | Age: 63
End: 2023-06-20
Payer: MEDICAID

## 2023-06-20 PROCEDURE — ZZZZZ: CPT

## 2023-07-12 ENCOUNTER — APPOINTMENT (OUTPATIENT)
Dept: OBGYN | Facility: CLINIC | Age: 63
End: 2023-07-12

## 2023-07-22 ENCOUNTER — RX RENEWAL (OUTPATIENT)
Age: 63
End: 2023-07-22

## 2023-07-24 ENCOUNTER — APPOINTMENT (OUTPATIENT)
Dept: PULMONOLOGY | Facility: CLINIC | Age: 63
End: 2023-07-24

## 2023-08-27 ENCOUNTER — RX RENEWAL (OUTPATIENT)
Age: 63
End: 2023-08-27

## 2023-09-20 ENCOUNTER — APPOINTMENT (OUTPATIENT)
Dept: INTERNAL MEDICINE | Facility: CLINIC | Age: 63
End: 2023-09-20
Payer: MEDICAID

## 2023-09-20 VITALS
SYSTOLIC BLOOD PRESSURE: 120 MMHG | OXYGEN SATURATION: 98 % | HEIGHT: 63 IN | HEART RATE: 92 BPM | WEIGHT: 117 LBS | DIASTOLIC BLOOD PRESSURE: 71 MMHG | BODY MASS INDEX: 20.73 KG/M2

## 2023-09-20 DIAGNOSIS — M25.50 PAIN IN UNSPECIFIED JOINT: ICD-10-CM

## 2023-09-20 DIAGNOSIS — R15.9 FULL INCONTINENCE OF FECES: ICD-10-CM

## 2023-09-20 DIAGNOSIS — Z23 ENCOUNTER FOR IMMUNIZATION: ICD-10-CM

## 2023-09-20 DIAGNOSIS — R53.82 CHRONIC FATIGUE, UNSPECIFIED: ICD-10-CM

## 2023-09-20 DIAGNOSIS — Z13.1 ENCOUNTER FOR SCREENING FOR DIABETES MELLITUS: ICD-10-CM

## 2023-09-20 DIAGNOSIS — K21.9 GASTRO-ESOPHAGEAL REFLUX DISEASE W/OUT ESOPHAGITIS: ICD-10-CM

## 2023-09-20 DIAGNOSIS — B02.9 ZOSTER W/OUT COMPLICATIONS: ICD-10-CM

## 2023-09-20 DIAGNOSIS — M25.561 PAIN IN RIGHT KNEE: ICD-10-CM

## 2023-09-20 PROCEDURE — 99214 OFFICE O/P EST MOD 30 MIN: CPT | Mod: 25

## 2023-09-20 RX ORDER — LIDOCAINE 4% 4 G/100G
4 CREAM TOPICAL
Qty: 1 | Refills: 0 | Status: ACTIVE | COMMUNITY
Start: 2023-09-20 | End: 1900-01-01

## 2023-09-21 ENCOUNTER — APPOINTMENT (OUTPATIENT)
Dept: ORTHOPEDIC SURGERY | Facility: CLINIC | Age: 63
End: 2023-09-21
Payer: MEDICAID

## 2023-09-21 VITALS
SYSTOLIC BLOOD PRESSURE: 91 MMHG | DIASTOLIC BLOOD PRESSURE: 69 MMHG | WEIGHT: 118 LBS | HEIGHT: 63 IN | BODY MASS INDEX: 20.91 KG/M2 | HEART RATE: 82 BPM

## 2023-09-21 DIAGNOSIS — M16.12 UNILATERAL PRIMARY OSTEOARTHRITIS, LEFT HIP: ICD-10-CM

## 2023-09-21 DIAGNOSIS — M17.12 UNILATERAL PRIMARY OSTEOARTHRITIS, LEFT KNEE: ICD-10-CM

## 2023-09-21 LAB
25(OH)D3 SERPL-MCNC: 43.5 NG/ML
ALBUMIN SERPL ELPH-MCNC: 4.5 G/DL
ALP BLD-CCNC: 225 U/L
ALT SERPL-CCNC: 51 U/L
ANION GAP SERPL CALC-SCNC: 11 MMOL/L
AST SERPL-CCNC: 47 U/L
BILIRUB SERPL-MCNC: 0.2 MG/DL
BUN SERPL-MCNC: 19 MG/DL
CALCIUM SERPL-MCNC: 9.7 MG/DL
CHLORIDE SERPL-SCNC: 102 MMOL/L
CHOLEST SERPL-MCNC: 224 MG/DL
CO2 SERPL-SCNC: 25 MMOL/L
CREAT SERPL-MCNC: 0.93 MG/DL
EGFR: 69 ML/MIN/1.73M2
ESTIMATED AVERAGE GLUCOSE: 105 MG/DL
FOLATE SERPL-MCNC: >20 NG/ML
GLUCOSE SERPL-MCNC: 103 MG/DL
HBA1C MFR BLD HPLC: 5.3 %
HCT VFR BLD CALC: 37.9 %
HDLC SERPL-MCNC: 69 MG/DL
HGB BLD-MCNC: 12 G/DL
IRON SERPL-MCNC: 87 UG/DL
LDLC SERPL CALC-MCNC: 125 MG/DL
MCHC RBC-ENTMCNC: 31.5 PG
MCHC RBC-ENTMCNC: 31.7 GM/DL
MCV RBC AUTO: 99.5 FL
NONHDLC SERPL-MCNC: 154 MG/DL
PLATELET # BLD AUTO: 372 K/UL
POTASSIUM SERPL-SCNC: 5.8 MMOL/L
PROT SERPL-MCNC: 7.3 G/DL
RBC # BLD: 3.81 M/UL
RBC # FLD: 12.6 %
SODIUM SERPL-SCNC: 138 MMOL/L
T3FREE SERPL-MCNC: 2.68 PG/ML
T4 FREE SERPL-MCNC: 0.9 NG/DL
TRIGL SERPL-MCNC: 167 MG/DL
TSH SERPL-ACNC: 1.08 UIU/ML
VIT B12 SERPL-MCNC: >2000 PG/ML
WBC # FLD AUTO: 9.68 K/UL

## 2023-09-21 PROCEDURE — 99215 OFFICE O/P EST HI 40 MIN: CPT | Mod: 25

## 2023-09-21 PROCEDURE — 73564 X-RAY EXAM KNEE 4 OR MORE: CPT | Mod: LT

## 2023-09-21 PROCEDURE — 73502 X-RAY EXAM HIP UNI 2-3 VIEWS: CPT | Mod: LT

## 2023-09-22 ENCOUNTER — APPOINTMENT (OUTPATIENT)
Dept: ORTHOPEDIC SURGERY | Facility: CLINIC | Age: 63
End: 2023-09-22

## 2023-09-22 LAB — ERYTHROCYTE [SEDIMENTATION RATE] IN BLOOD BY WESTERGREN METHOD: 4 MM/HR

## 2023-09-23 ENCOUNTER — NON-APPOINTMENT (OUTPATIENT)
Age: 63
End: 2023-09-23

## 2023-09-27 ENCOUNTER — APPOINTMENT (OUTPATIENT)
Dept: CT IMAGING | Facility: CLINIC | Age: 63
End: 2023-09-27

## 2023-09-28 ENCOUNTER — APPOINTMENT (OUTPATIENT)
Dept: CT IMAGING | Facility: CLINIC | Age: 63
End: 2023-09-28

## 2023-10-02 LAB
14-3-3 ETA AG SER IA-MCNC: <0.2 NG/ML
CCP ANTIBODIES IGG/IGA: <20 UNITS
RHEUMATOID FACTOR IDENTRA: <14 UNITS/ML

## 2023-10-05 ENCOUNTER — NON-APPOINTMENT (OUTPATIENT)
Age: 63
End: 2023-10-05

## 2023-10-11 ENCOUNTER — RX RENEWAL (OUTPATIENT)
Age: 63
End: 2023-10-11

## 2023-10-30 RX ORDER — MELOXICAM 15 MG/1
15 TABLET ORAL
Qty: 30 | Refills: 0 | Status: ACTIVE | COMMUNITY
Start: 2023-05-08 | End: 1900-01-01

## 2023-11-15 ENCOUNTER — OUTPATIENT (OUTPATIENT)
Dept: OUTPATIENT SERVICES | Facility: HOSPITAL | Age: 63
LOS: 1 days | End: 2023-11-15
Payer: MEDICAID

## 2023-11-15 VITALS
WEIGHT: 126.32 LBS | DIASTOLIC BLOOD PRESSURE: 76 MMHG | HEIGHT: 64 IN | RESPIRATION RATE: 18 BRPM | TEMPERATURE: 97 F | HEART RATE: 77 BPM | OXYGEN SATURATION: 97 % | SYSTOLIC BLOOD PRESSURE: 120 MMHG

## 2023-11-15 DIAGNOSIS — Z95.1 PRESENCE OF AORTOCORONARY BYPASS GRAFT: Chronic | ICD-10-CM

## 2023-11-15 DIAGNOSIS — M16.12 UNILATERAL PRIMARY OSTEOARTHRITIS, LEFT HIP: ICD-10-CM

## 2023-11-15 DIAGNOSIS — I25.10 ATHEROSCLEROTIC HEART DISEASE OF NATIVE CORONARY ARTERY WITHOUT ANGINA PECTORIS: ICD-10-CM

## 2023-11-15 DIAGNOSIS — Z90.710 ACQUIRED ABSENCE OF BOTH CERVIX AND UTERUS: Chronic | ICD-10-CM

## 2023-11-15 DIAGNOSIS — Z29.9 ENCOUNTER FOR PROPHYLACTIC MEASURES, UNSPECIFIED: ICD-10-CM

## 2023-11-15 DIAGNOSIS — Z01.818 ENCOUNTER FOR OTHER PREPROCEDURAL EXAMINATION: ICD-10-CM

## 2023-11-15 DIAGNOSIS — Z98.89 OTHER SPECIFIED POSTPROCEDURAL STATES: Chronic | ICD-10-CM

## 2023-11-15 DIAGNOSIS — Z96.611 PRESENCE OF RIGHT ARTIFICIAL SHOULDER JOINT: Chronic | ICD-10-CM

## 2023-11-15 LAB
A1C WITH ESTIMATED AVERAGE GLUCOSE RESULT: 5 % — SIGNIFICANT CHANGE UP (ref 4–5.6)
A1C WITH ESTIMATED AVERAGE GLUCOSE RESULT: 5 % — SIGNIFICANT CHANGE UP (ref 4–5.6)
ANION GAP SERPL CALC-SCNC: 11 MMOL/L — SIGNIFICANT CHANGE UP (ref 5–17)
ANION GAP SERPL CALC-SCNC: 11 MMOL/L — SIGNIFICANT CHANGE UP (ref 5–17)
APTT BLD: 30.3 SEC — SIGNIFICANT CHANGE UP (ref 24.5–35.6)
APTT BLD: 30.3 SEC — SIGNIFICANT CHANGE UP (ref 24.5–35.6)
BASOPHILS # BLD AUTO: 0.07 K/UL — SIGNIFICANT CHANGE UP (ref 0–0.2)
BASOPHILS # BLD AUTO: 0.07 K/UL — SIGNIFICANT CHANGE UP (ref 0–0.2)
BASOPHILS NFR BLD AUTO: 1 % — SIGNIFICANT CHANGE UP (ref 0–2)
BASOPHILS NFR BLD AUTO: 1 % — SIGNIFICANT CHANGE UP (ref 0–2)
BLD GP AB SCN SERPL QL: SIGNIFICANT CHANGE UP
BLD GP AB SCN SERPL QL: SIGNIFICANT CHANGE UP
BUN SERPL-MCNC: 16.9 MG/DL — SIGNIFICANT CHANGE UP (ref 8–20)
BUN SERPL-MCNC: 16.9 MG/DL — SIGNIFICANT CHANGE UP (ref 8–20)
CALCIUM SERPL-MCNC: 8.8 MG/DL — SIGNIFICANT CHANGE UP (ref 8.4–10.5)
CALCIUM SERPL-MCNC: 8.8 MG/DL — SIGNIFICANT CHANGE UP (ref 8.4–10.5)
CHLORIDE SERPL-SCNC: 102 MMOL/L — SIGNIFICANT CHANGE UP (ref 96–108)
CHLORIDE SERPL-SCNC: 102 MMOL/L — SIGNIFICANT CHANGE UP (ref 96–108)
CO2 SERPL-SCNC: 25 MMOL/L — SIGNIFICANT CHANGE UP (ref 22–29)
CO2 SERPL-SCNC: 25 MMOL/L — SIGNIFICANT CHANGE UP (ref 22–29)
CREAT SERPL-MCNC: 0.77 MG/DL — SIGNIFICANT CHANGE UP (ref 0.5–1.3)
CREAT SERPL-MCNC: 0.77 MG/DL — SIGNIFICANT CHANGE UP (ref 0.5–1.3)
EGFR: 87 ML/MIN/1.73M2 — SIGNIFICANT CHANGE UP
EGFR: 87 ML/MIN/1.73M2 — SIGNIFICANT CHANGE UP
EOSINOPHIL # BLD AUTO: 0.29 K/UL — SIGNIFICANT CHANGE UP (ref 0–0.5)
EOSINOPHIL # BLD AUTO: 0.29 K/UL — SIGNIFICANT CHANGE UP (ref 0–0.5)
EOSINOPHIL NFR BLD AUTO: 4.3 % — SIGNIFICANT CHANGE UP (ref 0–6)
EOSINOPHIL NFR BLD AUTO: 4.3 % — SIGNIFICANT CHANGE UP (ref 0–6)
ESTIMATED AVERAGE GLUCOSE: 97 MG/DL — SIGNIFICANT CHANGE UP (ref 68–114)
ESTIMATED AVERAGE GLUCOSE: 97 MG/DL — SIGNIFICANT CHANGE UP (ref 68–114)
GLUCOSE SERPL-MCNC: 81 MG/DL — SIGNIFICANT CHANGE UP (ref 70–99)
GLUCOSE SERPL-MCNC: 81 MG/DL — SIGNIFICANT CHANGE UP (ref 70–99)
HCT VFR BLD CALC: 32 % — LOW (ref 34.5–45)
HCT VFR BLD CALC: 32 % — LOW (ref 34.5–45)
HGB BLD-MCNC: 10.2 G/DL — LOW (ref 11.5–15.5)
HGB BLD-MCNC: 10.2 G/DL — LOW (ref 11.5–15.5)
IMM GRANULOCYTES NFR BLD AUTO: 0.3 % — SIGNIFICANT CHANGE UP (ref 0–0.9)
IMM GRANULOCYTES NFR BLD AUTO: 0.3 % — SIGNIFICANT CHANGE UP (ref 0–0.9)
INR BLD: 0.87 RATIO — SIGNIFICANT CHANGE UP (ref 0.85–1.18)
INR BLD: 0.87 RATIO — SIGNIFICANT CHANGE UP (ref 0.85–1.18)
LYMPHOCYTES # BLD AUTO: 1.87 K/UL — SIGNIFICANT CHANGE UP (ref 1–3.3)
LYMPHOCYTES # BLD AUTO: 1.87 K/UL — SIGNIFICANT CHANGE UP (ref 1–3.3)
LYMPHOCYTES # BLD AUTO: 27.6 % — SIGNIFICANT CHANGE UP (ref 13–44)
LYMPHOCYTES # BLD AUTO: 27.6 % — SIGNIFICANT CHANGE UP (ref 13–44)
MCHC RBC-ENTMCNC: 30.7 PG — SIGNIFICANT CHANGE UP (ref 27–34)
MCHC RBC-ENTMCNC: 30.7 PG — SIGNIFICANT CHANGE UP (ref 27–34)
MCHC RBC-ENTMCNC: 31.9 GM/DL — LOW (ref 32–36)
MCHC RBC-ENTMCNC: 31.9 GM/DL — LOW (ref 32–36)
MCV RBC AUTO: 96.4 FL — SIGNIFICANT CHANGE UP (ref 80–100)
MCV RBC AUTO: 96.4 FL — SIGNIFICANT CHANGE UP (ref 80–100)
MONOCYTES # BLD AUTO: 0.39 K/UL — SIGNIFICANT CHANGE UP (ref 0–0.9)
MONOCYTES # BLD AUTO: 0.39 K/UL — SIGNIFICANT CHANGE UP (ref 0–0.9)
MONOCYTES NFR BLD AUTO: 5.8 % — SIGNIFICANT CHANGE UP (ref 2–14)
MONOCYTES NFR BLD AUTO: 5.8 % — SIGNIFICANT CHANGE UP (ref 2–14)
NEUTROPHILS # BLD AUTO: 4.14 K/UL — SIGNIFICANT CHANGE UP (ref 1.8–7.4)
NEUTROPHILS # BLD AUTO: 4.14 K/UL — SIGNIFICANT CHANGE UP (ref 1.8–7.4)
NEUTROPHILS NFR BLD AUTO: 61 % — SIGNIFICANT CHANGE UP (ref 43–77)
NEUTROPHILS NFR BLD AUTO: 61 % — SIGNIFICANT CHANGE UP (ref 43–77)
PLATELET # BLD AUTO: 263 K/UL — SIGNIFICANT CHANGE UP (ref 150–400)
PLATELET # BLD AUTO: 263 K/UL — SIGNIFICANT CHANGE UP (ref 150–400)
POTASSIUM SERPL-MCNC: 4.9 MMOL/L — SIGNIFICANT CHANGE UP (ref 3.5–5.3)
POTASSIUM SERPL-MCNC: 4.9 MMOL/L — SIGNIFICANT CHANGE UP (ref 3.5–5.3)
POTASSIUM SERPL-SCNC: 4.9 MMOL/L — SIGNIFICANT CHANGE UP (ref 3.5–5.3)
POTASSIUM SERPL-SCNC: 4.9 MMOL/L — SIGNIFICANT CHANGE UP (ref 3.5–5.3)
PROTHROM AB SERPL-ACNC: 9.7 SEC — SIGNIFICANT CHANGE UP (ref 9.5–13)
PROTHROM AB SERPL-ACNC: 9.7 SEC — SIGNIFICANT CHANGE UP (ref 9.5–13)
RBC # BLD: 3.32 M/UL — LOW (ref 3.8–5.2)
RBC # BLD: 3.32 M/UL — LOW (ref 3.8–5.2)
RBC # FLD: 12.3 % — SIGNIFICANT CHANGE UP (ref 10.3–14.5)
RBC # FLD: 12.3 % — SIGNIFICANT CHANGE UP (ref 10.3–14.5)
SODIUM SERPL-SCNC: 138 MMOL/L — SIGNIFICANT CHANGE UP (ref 135–145)
SODIUM SERPL-SCNC: 138 MMOL/L — SIGNIFICANT CHANGE UP (ref 135–145)
WBC # BLD: 6.78 K/UL — SIGNIFICANT CHANGE UP (ref 3.8–10.5)
WBC # BLD: 6.78 K/UL — SIGNIFICANT CHANGE UP (ref 3.8–10.5)
WBC # FLD AUTO: 6.78 K/UL — SIGNIFICANT CHANGE UP (ref 3.8–10.5)
WBC # FLD AUTO: 6.78 K/UL — SIGNIFICANT CHANGE UP (ref 3.8–10.5)

## 2023-11-15 PROCEDURE — 93005 ELECTROCARDIOGRAM TRACING: CPT

## 2023-11-15 PROCEDURE — G0463: CPT

## 2023-11-15 PROCEDURE — 93010 ELECTROCARDIOGRAM REPORT: CPT

## 2023-11-15 NOTE — H&P PST ADULT - ASSESSMENT
CAPRINI VTE 2.0 SCORE [CLOT updated 2019]    AGE RELATED RISK FACTORS                                                       MOBILITY RELATED FACTORS  [ ] Age 41-60 years                                            (1 Point)                    [ ] Bed rest                                                        (1 Point)  [x ] Age: 61-74 years                                           (2 Points)                  [ ] Plaster cast                                                   (2 Points)  [ ] Age= 75 years                                              (3 Points)                    [ ] Bed bound for more than 72 hours                 (2 Points)    DISEASE RELATED RISK FACTORS                                               GENDER SPECIFIC FACTORS  [ ] Edema in the lower extremities                       (1 Point)              [ ] Pregnancy                                                     (1 Point)  [ ] Varicose veins                                               (1 Point)                     [ ] Post-partum < 6 weeks                                   (1 Point)             [x ] BMI > 25 Kg/m2                                            (1 Point)                     [ ] Hormonal therapy  or oral contraception          (1 Point)                 [ ] Sepsis (in the previous month)                        (1 Point)               [ ] History of pregnancy complications                 (1 point)  [ ] Pneumonia or serious lung disease                                               [ ] Unexplained or recurrent                     (1 Point)           (in the previous month)                               (1 Point)  [ ] Abnormal pulmonary function test                     (1 Point)                 SURGERY RELATED RISK FACTORS  [ ] Acute myocardial infarction                              (1 Point)               [ ]  Section                                             (1 Point)  [ ] Congestive heart failure (in the previous month)  (1 Point)      [ ] Minor surgery                                                  (1 Point)   [ ] Inflammatory bowel disease                             (1 Point)               [ ] Arthroscopic surgery                                        (2 Points)  [ ] Central venous access                                      (2 Points)                [ ] General surgery lasting more than 45 minutes (2 points)  [ ] Malignancy- Present or previous                   (2 Points)                [x ] Elective arthroplasty                                         (5 points)    [ ] Stroke (in the previous month)                          (5 Points)                                                                                                                                                           HEMATOLOGY RELATED FACTORS                                                 TRAUMA RELATED RISK FACTORS  [ ] Prior episodes of VTE                                     (3 Points)                [ ] Fracture of the hip, pelvis, or leg                       (5 Points)  [ ] Positive family history for VTE                         (3 Points)             [ ] Acute spinal cord injury (in the previous month)  (5 Points)  [ ] Prothrombin 39476 A                                     (3 Points)               [ ] Paralysis  (less than 1 month)                             (5 Points)  [ ] Factor V Leiden                                             (3 Points)                  [ ] Multiple Trauma within 1 month                        (5 Points)  [ ] Lupus anticoagulants                                     (3 Points)                                                           [ ] Anticardiolipin antibodies                               (3 Points)                                                       [ ] High homocysteine in the blood                      (3 Points)                                             [ ] Other congenital or acquired thrombophilia      (3 Points)                                                [ ] Heparin induced thrombocytopenia                  (3 Points)                                     Total Score [      8    ]  OPIOID RISK TOOL    CAT EACH BOX THAT APPLIES AND ADD TOTALS AT THE END    FAMILY HISTORY OF SUBSTANCE ABUSE                 FEMALE         MALE                                                Alcohol                             [  ]1 pt          [  ]3pts                                               Illegal Durgs                     [  ]2 pts        [  ]3pts                                               Rx Drugs                           [  ]4 pts        [  ]4 pts    PERSONAL HISTORY OF SUBSTANCE ABUSE                                                                                          Alcohol                             [  ]3 pts       [  ]3 pts                                               Illegal Drugs                     [  ]4 pts        [  ]4 pts                                               Rx Drugs                           [  ]5 pts        [  ]5 pts    AGE BETWEEN 16-45 YEARS                                      [  ]1 pt         [  ]1 pt    HISTORY OF PREADOLESCENT   SEXUAL ABUSE                                                             [  ]3 pts        [  ]0pts    PSYCHOLOGICAL DISEASE                     ADD, OCD, Bipolar, Schizophrenia        [  ]2 pts         [  ]2 pts                      Depression                                               [  ]1 pt           [  ]1 pt           SCORING TOTAL   (add numbers and type here)              (**0*)                                     A score of 3 or lower indicated LOW risk for future opioid abuse  A score of 4 to 7 indicated moderate risk for future opioid abuse  A score of 8 or higher indicates a high risk for opioid abuse Pt is a 63 years old female seen today pr-op for left posterior total hip replacement for unilateral primary OA, Pt reports longstanding hip pain, now progressively worsening within the last one year, denies any trauma.  Pt medical hx includes CAD. CABG, HTN, HLD, anemia, thyroid disorder, heart valve disease, and Pmhx Meningitis.   Pt reports left hip pain aggravated with climbing up and down the stairs, standing, walking, difficulty putting on his shoes and socks, described pain as aching 10/10 at maximum and 2/10 at rest.  Pt reports left hip pain keeps her awake at night. Pt scheduled for this surgery on 23 with Dr. Billy, surgery protocol reviewed with Pt today. Pt to follow-up with PCP an cardiologist for evaluation and clearance and Dr. Brown psychiatrist to provide recommendation for Suboxone         CAPRINI VTE 2.0 SCORE [CLOT updated 2019]    AGE RELATED RISK FACTORS                                                       MOBILITY RELATED FACTORS  [ ] Age 41-60 years                                            (1 Point)                    [ ] Bed rest                                                        (1 Point)  [x ] Age: 61-74 years                                           (2 Points)                  [ ] Plaster cast                                                   (2 Points)  [ ] Age= 75 years                                              (3 Points)                    [ ] Bed bound for more than 72 hours                 (2 Points)    DISEASE RELATED RISK FACTORS                                               GENDER SPECIFIC FACTORS  [ ] Edema in the lower extremities                       (1 Point)              [ ] Pregnancy                                                     (1 Point)  [ ] Varicose veins                                               (1 Point)                     [ ] Post-partum < 6 weeks                                   (1 Point)             [x ] BMI > 25 Kg/m2                                            (1 Point)                     [ ] Hormonal therapy  or oral contraception          (1 Point)                 [ ] Sepsis (in the previous month)                        (1 Point)               [ ] History of pregnancy complications                 (1 point)  [ ] Pneumonia or serious lung disease                                               [ ] Unexplained or recurrent                     (1 Point)           (in the previous month)                               (1 Point)  [ ] Abnormal pulmonary function test                     (1 Point)                 SURGERY RELATED RISK FACTORS  [ ] Acute myocardial infarction                              (1 Point)               [ ]  Section                                             (1 Point)  [ ] Congestive heart failure (in the previous month)  (1 Point)      [ ] Minor surgery                                                  (1 Point)   [ ] Inflammatory bowel disease                             (1 Point)               [ ] Arthroscopic surgery                                        (2 Points)  [ ] Central venous access                                      (2 Points)                [ ] General surgery lasting more than 45 minutes (2 points)  [ ] Malignancy- Present or previous                   (2 Points)                [x ] Elective arthroplasty                                         (5 points)    [ ] Stroke (in the previous month)                          (5 Points)                                                                                                                                                           HEMATOLOGY RELATED FACTORS                                                 TRAUMA RELATED RISK FACTORS  [ ] Prior episodes of VTE                                     (3 Points)                [ ] Fracture of the hip, pelvis, or leg                       (5 Points)  [ ] Positive family history for VTE                         (3 Points)             [ ] Acute spinal cord injury (in the previous month)  (5 Points)  [ ] Prothrombin 52919 A                                     (3 Points)               [ ] Paralysis  (less than 1 month)                             (5 Points)  [ ] Factor V Leiden                                             (3 Points)                  [ ] Multiple Trauma within 1 month                        (5 Points)  [ ] Lupus anticoagulants                                     (3 Points)                                                           [ ] Anticardiolipin antibodies                               (3 Points)                                                       [ ] High homocysteine in the blood                      (3 Points)                                             [ ] Other congenital or acquired thrombophilia      (3 Points)                                                [ ] Heparin induced thrombocytopenia                  (3 Points)                                     Total Score [      8    ]  OPIOID RISK TOOL    CAT EACH BOX THAT APPLIES AND ADD TOTALS AT THE END    FAMILY HISTORY OF SUBSTANCE ABUSE                 FEMALE         MALE                                                Alcohol                             [  ]1 pt          [  ]3pts                                               Illegal Durgs                     [  ]2 pts        [  ]3pts                                               Rx Drugs                           [  ]4 pts        [  ]4 pts    PERSONAL HISTORY OF SUBSTANCE ABUSE                                                                                          Alcohol                             [  ]3 pts       [  ]3 pts                                               Illegal Drugs                     [  ]4 pts        [  ]4 pts                                               Rx Drugs                           [  ]5 pts        [  ]5 pts    AGE BETWEEN 16-45 YEARS                                      [  ]1 pt         [  ]1 pt    HISTORY OF PREADOLESCENT   SEXUAL ABUSE                                                             [  ]3 pts        [  ]0pts    PSYCHOLOGICAL DISEASE                     ADD, OCD, Bipolar, Schizophrenia        [  ]2 pts         [  ]2 pts                      Depression                                               [ x ]1 pt           [  ]1 pt           SCORING TOTAL   (add numbers and type here)              (**1*)                                     A score of 3 or lower indicated LOW risk for future opioid abuse  A score of 4 to 7 indicated moderate risk for future opioid abuse  A score of 8 or higher indicates a high risk for opioid abuse

## 2023-11-15 NOTE — H&P PST ADULT - OTHER CARE PROVIDERS
Dr. Torres [phone] Dr. Torres [phone], Dr. Tierney [phone], Dr. Brown Psychiatrist/Rehab [phone]

## 2023-11-15 NOTE — H&P PST ADULT - HISTORY OF PRESENT ILLNESS
Pt is a 63 years old female seen today pr-op for left posterior total hip replacement for unilateral primary OA,  Pt is a 63 years old female seen today pr-op for left posterior total hip replacement for unilateral primary OA, Pt reports longstanding hip pain, now progressively worsening within the last one year. Pt medical hx includes CAD, cabg, htn, hld, Pt reports left hip pain aggravated with climbing up and down the stairs, standing, walking, difficullty, left hip pain keep Pt up at night    Pt is a 63 years old female seen today pr-op for left posterior total hip replacement for unilateral primary OA, Pt reports longstanding hip pain, now progressively worsening within the last one year, denies any trauma.  Pt medical hx includes CAD. CABG, HTN, HLD, anemia, thyroid disorder, heart valve disease, and Pmhx Meningitis.   Pt reports left hip pain aggravated with climbing up and down the stairs, standing, walking, difficulty putting on his shoes and socks, described pain as aching 10/10 at maximum and 2/10 at rest.  Pt reports left hip pain keeps her awake at night. Pt scheduled for this surgery on 11/24/23 with Dr. Billy

## 2023-11-15 NOTE — H&P PST ADULT - PROBLEM SELECTOR PLAN 1
left posterior total hip replacement left posterior total hip replacement, Pt to follow-up with PCP an cardiologist for evaluation and clearance and Dr. Brown psychiatrist to provide recommendation for Suboxone

## 2023-11-15 NOTE — H&P PST ADULT - NSICDXPASTMEDICALHX_GEN_ALL_CORE_FT
PAST MEDICAL HISTORY:  Anemia     CAD (coronary artery disease)     Chronic back pain greater than 3 months duration     Depression     Heart attack     Herniated nucleus pulposus     High cholesterol     Hypertension     Hyperthyroidism     Meningitis     Meningitis     Migraine     Mitral regurgitation     Opiate abuse, continuous 4 years ago    Pulmonary hypertension     Tricuspid regurgitation     Unilateral primary osteoarthritis, left hip

## 2023-11-16 ENCOUNTER — RX RENEWAL (OUTPATIENT)
Age: 63
End: 2023-11-16

## 2023-11-16 LAB
MRSA PCR RESULT.: DETECTED
MRSA PCR RESULT.: DETECTED
S AUREUS DNA NOSE QL NAA+PROBE: DETECTED
S AUREUS DNA NOSE QL NAA+PROBE: DETECTED

## 2023-11-16 RX ORDER — MUPIROCIN 20 MG/G
1 OINTMENT TOPICAL
Qty: 1 | Refills: 0
Start: 2023-11-16 | End: 2023-11-20

## 2023-11-20 ENCOUNTER — APPOINTMENT (OUTPATIENT)
Dept: CARDIOLOGY | Facility: CLINIC | Age: 63
End: 2023-11-20
Payer: MEDICAID

## 2023-11-20 VITALS
DIASTOLIC BLOOD PRESSURE: 64 MMHG | SYSTOLIC BLOOD PRESSURE: 118 MMHG | OXYGEN SATURATION: 97 % | HEIGHT: 63 IN | HEART RATE: 84 BPM | BODY MASS INDEX: 22.15 KG/M2 | RESPIRATION RATE: 16 BRPM | WEIGHT: 125 LBS

## 2023-11-20 DIAGNOSIS — E78.1 PURE HYPERGLYCERIDEMIA: ICD-10-CM

## 2023-11-20 DIAGNOSIS — I10 ESSENTIAL (PRIMARY) HYPERTENSION: ICD-10-CM

## 2023-11-20 DIAGNOSIS — E78.5 HYPERLIPIDEMIA, UNSPECIFIED: ICD-10-CM

## 2023-11-20 DIAGNOSIS — I34.0 NONRHEUMATIC MITRAL (VALVE) INSUFFICIENCY: ICD-10-CM

## 2023-11-20 DIAGNOSIS — I51.7 CARDIOMEGALY: ICD-10-CM

## 2023-11-20 DIAGNOSIS — I36.1 NONRHEUMATIC TRICUSPID (VALVE) INSUFFICIENCY: ICD-10-CM

## 2023-11-20 DIAGNOSIS — Z01.810 ENCOUNTER FOR PREPROCEDURAL CARDIOVASCULAR EXAMINATION: ICD-10-CM

## 2023-11-20 DIAGNOSIS — I25.10 ATHEROSCLEROTIC HEART DISEASE OF NATIVE CORONARY ARTERY W/OUT ANGINA PECTORIS: ICD-10-CM

## 2023-11-20 PROBLEM — M16.12 UNILATERAL PRIMARY OSTEOARTHRITIS, LEFT HIP: Chronic | Status: ACTIVE | Noted: 2023-11-15

## 2023-11-20 PROCEDURE — 99214 OFFICE O/P EST MOD 30 MIN: CPT

## 2023-11-20 RX ORDER — ATORVASTATIN CALCIUM 20 MG/1
20 TABLET, FILM COATED ORAL
Qty: 90 | Refills: 2 | Status: ACTIVE | COMMUNITY
Start: 2021-04-12 | End: 1900-01-01

## 2023-11-21 ENCOUNTER — APPOINTMENT (OUTPATIENT)
Dept: INTERNAL MEDICINE | Facility: CLINIC | Age: 63
End: 2023-11-21
Payer: MEDICAID

## 2023-11-21 VITALS
BODY MASS INDEX: 22.15 KG/M2 | WEIGHT: 125 LBS | DIASTOLIC BLOOD PRESSURE: 64 MMHG | OXYGEN SATURATION: 97 % | SYSTOLIC BLOOD PRESSURE: 99 MMHG | HEART RATE: 85 BPM | HEIGHT: 63 IN

## 2023-11-21 DIAGNOSIS — Z01.818 ENCOUNTER FOR OTHER PREPROCEDURAL EXAMINATION: ICD-10-CM

## 2023-11-21 PROCEDURE — 99213 OFFICE O/P EST LOW 20 MIN: CPT

## 2023-11-21 RX ORDER — COLESEVELAM HYDROCHLORIDE 625 MG/1
625 TABLET, COATED ORAL
Qty: 60 | Refills: 6 | Status: DISCONTINUED | COMMUNITY
Start: 2022-05-09 | End: 2023-11-21

## 2023-11-21 RX ORDER — FERROUS SULFATE 325(65) MG
325 (65 FE) TABLET ORAL DAILY
Refills: 0 | Status: DISCONTINUED | COMMUNITY
End: 2023-11-21

## 2023-11-21 RX ORDER — FAMOTIDINE 20 MG/1
20 TABLET, FILM COATED ORAL
Qty: 90 | Refills: 1 | Status: DISCONTINUED | COMMUNITY
Start: 2023-02-06 | End: 2023-11-21

## 2023-11-21 RX ORDER — VALACYCLOVIR 1 G/1
1 TABLET, FILM COATED ORAL 3 TIMES DAILY
Qty: 21 | Refills: 0 | Status: DISCONTINUED | COMMUNITY
Start: 2023-09-20 | End: 2023-11-21

## 2023-11-23 ENCOUNTER — TRANSCRIPTION ENCOUNTER (OUTPATIENT)
Age: 63
End: 2023-11-23

## 2023-11-24 ENCOUNTER — TRANSCRIPTION ENCOUNTER (OUTPATIENT)
Age: 63
End: 2023-11-24

## 2023-11-24 ENCOUNTER — INPATIENT (INPATIENT)
Facility: HOSPITAL | Age: 63
LOS: 1 days | Discharge: HOME HEALTH | DRG: 470 | End: 2023-11-26
Attending: STUDENT IN AN ORGANIZED HEALTH CARE EDUCATION/TRAINING PROGRAM | Admitting: STUDENT IN AN ORGANIZED HEALTH CARE EDUCATION/TRAINING PROGRAM
Payer: MEDICAID

## 2023-11-24 ENCOUNTER — APPOINTMENT (OUTPATIENT)
Dept: ORTHOPEDIC SURGERY | Facility: HOSPITAL | Age: 63
End: 2023-11-24

## 2023-11-24 VITALS
TEMPERATURE: 98 F | SYSTOLIC BLOOD PRESSURE: 116 MMHG | HEART RATE: 68 BPM | DIASTOLIC BLOOD PRESSURE: 67 MMHG | OXYGEN SATURATION: 98 % | WEIGHT: 126.32 LBS | RESPIRATION RATE: 16 BRPM | HEIGHT: 64 IN

## 2023-11-24 DIAGNOSIS — M16.12 UNILATERAL PRIMARY OSTEOARTHRITIS, LEFT HIP: ICD-10-CM

## 2023-11-24 DIAGNOSIS — Z98.89 OTHER SPECIFIED POSTPROCEDURAL STATES: Chronic | ICD-10-CM

## 2023-11-24 DIAGNOSIS — Z96.611 PRESENCE OF RIGHT ARTIFICIAL SHOULDER JOINT: Chronic | ICD-10-CM

## 2023-11-24 DIAGNOSIS — Z95.1 PRESENCE OF AORTOCORONARY BYPASS GRAFT: Chronic | ICD-10-CM

## 2023-11-24 DIAGNOSIS — Z90.710 ACQUIRED ABSENCE OF BOTH CERVIX AND UTERUS: Chronic | ICD-10-CM

## 2023-11-24 PROCEDURE — 27130 TOTAL HIP ARTHROPLASTY: CPT | Mod: LT

## 2023-11-24 PROCEDURE — 27130 TOTAL HIP ARTHROPLASTY: CPT | Mod: AS,LT

## 2023-11-24 PROCEDURE — 73502 X-RAY EXAM HIP UNI 2-3 VIEWS: CPT | Mod: 26,LT

## 2023-11-24 PROCEDURE — 99232 SBSQ HOSP IP/OBS MODERATE 35: CPT

## 2023-11-24 DEVICE — HEAD FEM CERAMIC 12/14 36MM PLUS 5: Type: IMPLANTABLE DEVICE | Site: LEFT | Status: FUNCTIONAL

## 2023-11-24 DEVICE — IMPLANTABLE DEVICE: Type: IMPLANTABLE DEVICE | Site: LEFT | Status: FUNCTIONAL

## 2023-11-24 DEVICE — STEM FEM TAPR STD COLLAR 12/14 SZ 5: Type: IMPLANTABLE DEVICE | Site: LEFT | Status: FUNCTIONAL

## 2023-11-24 DEVICE — SCREW BONE 30MM CANCELLOUS: Type: IMPLANTABLE DEVICE | Site: LEFT | Status: FUNCTIONAL

## 2023-11-24 DEVICE — SHELL ACET PINNACLE W/ GRIPTION 52MM: Type: IMPLANTABLE DEVICE | Site: LEFT | Status: FUNCTIONAL

## 2023-11-24 RX ORDER — CELECOXIB 200 MG/1
400 CAPSULE ORAL ONCE
Refills: 0 | Status: COMPLETED | OUTPATIENT
Start: 2023-11-24 | End: 2023-11-24

## 2023-11-24 RX ORDER — SODIUM CHLORIDE 9 MG/ML
3 INJECTION INTRAMUSCULAR; INTRAVENOUS; SUBCUTANEOUS EVERY 8 HOURS
Refills: 0 | Status: DISCONTINUED | OUTPATIENT
Start: 2023-11-24 | End: 2023-11-24

## 2023-11-24 RX ORDER — HYDROMORPHONE HYDROCHLORIDE 2 MG/ML
0.5 INJECTION INTRAMUSCULAR; INTRAVENOUS; SUBCUTANEOUS
Refills: 0 | Status: DISCONTINUED | OUTPATIENT
Start: 2023-11-24 | End: 2023-11-24

## 2023-11-24 RX ORDER — LAMOTRIGINE 25 MG/1
100 TABLET, ORALLY DISINTEGRATING ORAL
Refills: 0 | Status: DISCONTINUED | OUTPATIENT
Start: 2023-11-24 | End: 2023-11-26

## 2023-11-24 RX ORDER — ACETAMINOPHEN 500 MG
975 TABLET ORAL EVERY 8 HOURS
Refills: 0 | Status: DISCONTINUED | OUTPATIENT
Start: 2023-11-24 | End: 2023-11-26

## 2023-11-24 RX ORDER — POLYETHYLENE GLYCOL 3350 17 G/17G
17 POWDER, FOR SOLUTION ORAL AT BEDTIME
Refills: 0 | Status: DISCONTINUED | OUTPATIENT
Start: 2023-11-24 | End: 2023-11-26

## 2023-11-24 RX ORDER — ONDANSETRON 8 MG/1
4 TABLET, FILM COATED ORAL EVERY 6 HOURS
Refills: 0 | Status: DISCONTINUED | OUTPATIENT
Start: 2023-11-24 | End: 2023-11-26

## 2023-11-24 RX ORDER — FENTANYL CITRATE 50 UG/ML
25 INJECTION INTRAVENOUS
Refills: 0 | Status: DISCONTINUED | OUTPATIENT
Start: 2023-11-24 | End: 2023-11-24

## 2023-11-24 RX ORDER — OXYCODONE HYDROCHLORIDE 5 MG/1
5 TABLET ORAL
Refills: 0 | Status: DISCONTINUED | OUTPATIENT
Start: 2023-11-24 | End: 2023-11-26

## 2023-11-24 RX ORDER — ACETAMINOPHEN 500 MG
975 TABLET ORAL ONCE
Refills: 0 | Status: COMPLETED | OUTPATIENT
Start: 2023-11-24 | End: 2023-11-24

## 2023-11-24 RX ORDER — VANCOMYCIN HCL 1 G
1000 VIAL (EA) INTRAVENOUS ONCE
Refills: 0 | Status: COMPLETED | OUTPATIENT
Start: 2023-11-24 | End: 2023-11-24

## 2023-11-24 RX ORDER — MAGNESIUM HYDROXIDE 400 MG/1
30 TABLET, CHEWABLE ORAL DAILY
Refills: 0 | Status: DISCONTINUED | OUTPATIENT
Start: 2023-11-24 | End: 2023-11-26

## 2023-11-24 RX ORDER — APREPITANT 80 MG/1
40 CAPSULE ORAL ONCE
Refills: 0 | Status: COMPLETED | OUTPATIENT
Start: 2023-11-24 | End: 2023-11-24

## 2023-11-24 RX ORDER — HYDROMORPHONE HYDROCHLORIDE 2 MG/ML
4 INJECTION INTRAMUSCULAR; INTRAVENOUS; SUBCUTANEOUS EVERY 4 HOURS
Refills: 0 | Status: DISCONTINUED | OUTPATIENT
Start: 2023-11-24 | End: 2023-11-26

## 2023-11-24 RX ORDER — ASPIRIN/CALCIUM CARB/MAGNESIUM 324 MG
81 TABLET ORAL
Refills: 0 | Status: DISCONTINUED | OUTPATIENT
Start: 2023-11-25 | End: 2023-11-26

## 2023-11-24 RX ORDER — GABAPENTIN 400 MG/1
800 CAPSULE ORAL DAILY
Refills: 0 | Status: DISCONTINUED | OUTPATIENT
Start: 2023-11-24 | End: 2023-11-25

## 2023-11-24 RX ORDER — ACETAMINOPHEN 500 MG
1000 TABLET ORAL ONCE
Refills: 0 | Status: COMPLETED | OUTPATIENT
Start: 2023-11-24 | End: 2023-11-25

## 2023-11-24 RX ORDER — VALSARTAN 80 MG/1
80 TABLET ORAL DAILY
Refills: 0 | Status: DISCONTINUED | OUTPATIENT
Start: 2023-11-26 | End: 2023-11-26

## 2023-11-24 RX ORDER — CELECOXIB 200 MG/1
200 CAPSULE ORAL EVERY 12 HOURS
Refills: 0 | Status: DISCONTINUED | OUTPATIENT
Start: 2023-11-26 | End: 2023-11-26

## 2023-11-24 RX ORDER — SODIUM CHLORIDE 9 MG/ML
1000 INJECTION INTRAMUSCULAR; INTRAVENOUS; SUBCUTANEOUS
Refills: 0 | Status: DISCONTINUED | OUTPATIENT
Start: 2023-11-24 | End: 2023-11-26

## 2023-11-24 RX ORDER — NICOTINE POLACRILEX 2 MG
1 GUM BUCCAL
Refills: 0 | DISCHARGE

## 2023-11-24 RX ORDER — KETOROLAC TROMETHAMINE 30 MG/ML
15 SYRINGE (ML) INJECTION ONCE
Refills: 0 | Status: DISCONTINUED | OUTPATIENT
Start: 2023-11-24 | End: 2023-11-24

## 2023-11-24 RX ORDER — TRANEXAMIC ACID 100 MG/ML
1000 INJECTION, SOLUTION INTRAVENOUS ONCE
Refills: 0 | Status: DISCONTINUED | OUTPATIENT
Start: 2023-11-24 | End: 2023-11-24

## 2023-11-24 RX ORDER — KETOROLAC TROMETHAMINE 30 MG/ML
15 SYRINGE (ML) INJECTION EVERY 6 HOURS
Refills: 0 | Status: DISCONTINUED | OUTPATIENT
Start: 2023-11-24 | End: 2023-11-25

## 2023-11-24 RX ORDER — SODIUM CHLORIDE 9 MG/ML
1000 INJECTION, SOLUTION INTRAVENOUS
Refills: 0 | Status: DISCONTINUED | OUTPATIENT
Start: 2023-11-24 | End: 2023-11-24

## 2023-11-24 RX ORDER — GABAPENTIN 400 MG/1
1 CAPSULE ORAL
Refills: 0 | DISCHARGE

## 2023-11-24 RX ORDER — ONDANSETRON 8 MG/1
4 TABLET, FILM COATED ORAL ONCE
Refills: 0 | Status: DISCONTINUED | OUTPATIENT
Start: 2023-11-24 | End: 2023-11-24

## 2023-11-24 RX ORDER — ESCITALOPRAM OXALATE 10 MG/1
20 TABLET, FILM COATED ORAL DAILY
Refills: 0 | Status: DISCONTINUED | OUTPATIENT
Start: 2023-11-24 | End: 2023-11-26

## 2023-11-24 RX ORDER — ESCITALOPRAM OXALATE 10 MG/1
1 TABLET, FILM COATED ORAL
Refills: 0 | DISCHARGE

## 2023-11-24 RX ORDER — SODIUM CHLORIDE 9 MG/ML
500 INJECTION INTRAMUSCULAR; INTRAVENOUS; SUBCUTANEOUS ONCE
Refills: 0 | Status: COMPLETED | OUTPATIENT
Start: 2023-11-24 | End: 2023-11-24

## 2023-11-24 RX ORDER — OXYCODONE HYDROCHLORIDE 5 MG/1
10 TABLET ORAL
Refills: 0 | Status: DISCONTINUED | OUTPATIENT
Start: 2023-11-24 | End: 2023-11-26

## 2023-11-24 RX ORDER — PREGABALIN 225 MG/1
1 CAPSULE ORAL
Refills: 0 | DISCHARGE

## 2023-11-24 RX ORDER — LAMOTRIGINE 25 MG/1
1 TABLET, ORALLY DISINTEGRATING ORAL
Refills: 0 | DISCHARGE

## 2023-11-24 RX ORDER — OMEGA-3 ACID ETHYL ESTERS 1 G
1 CAPSULE ORAL
Refills: 0 | DISCHARGE

## 2023-11-24 RX ORDER — GENTAMICIN SULFATE 40 MG/ML
200 VIAL (ML) INJECTION ONCE
Refills: 0 | Status: DISCONTINUED | OUTPATIENT
Start: 2023-11-24 | End: 2023-11-24

## 2023-11-24 RX ORDER — BUPRENORPHINE AND NALOXONE 2; .5 MG/1; MG/1
2 TABLET SUBLINGUAL
Refills: 0 | DISCHARGE

## 2023-11-24 RX ORDER — PANTOPRAZOLE SODIUM 20 MG/1
40 TABLET, DELAYED RELEASE ORAL
Refills: 0 | Status: DISCONTINUED | OUTPATIENT
Start: 2023-11-24 | End: 2023-11-26

## 2023-11-24 RX ORDER — VANCOMYCIN HCL 1 G
1000 VIAL (EA) INTRAVENOUS
Refills: 0 | Status: COMPLETED | OUTPATIENT
Start: 2023-11-24 | End: 2023-11-24

## 2023-11-24 RX ORDER — SENNA PLUS 8.6 MG/1
2 TABLET ORAL AT BEDTIME
Refills: 0 | Status: DISCONTINUED | OUTPATIENT
Start: 2023-11-24 | End: 2023-11-26

## 2023-11-24 RX ADMIN — OXYCODONE HYDROCHLORIDE 10 MILLIGRAM(S): 5 TABLET ORAL at 17:36

## 2023-11-24 RX ADMIN — Medication 15 MILLIGRAM(S): at 17:51

## 2023-11-24 RX ADMIN — HYDROMORPHONE HYDROCHLORIDE 0.5 MILLIGRAM(S): 2 INJECTION INTRAMUSCULAR; INTRAVENOUS; SUBCUTANEOUS at 11:00

## 2023-11-24 RX ADMIN — Medication 975 MILLIGRAM(S): at 06:26

## 2023-11-24 RX ADMIN — SENNA PLUS 2 TABLET(S): 8.6 TABLET ORAL at 21:04

## 2023-11-24 RX ADMIN — OXYCODONE HYDROCHLORIDE 10 MILLIGRAM(S): 5 TABLET ORAL at 18:16

## 2023-11-24 RX ADMIN — SODIUM CHLORIDE 500 MILLILITER(S): 9 INJECTION INTRAMUSCULAR; INTRAVENOUS; SUBCUTANEOUS at 09:55

## 2023-11-24 RX ADMIN — Medication 250 MILLIGRAM(S): at 17:36

## 2023-11-24 RX ADMIN — OXYCODONE HYDROCHLORIDE 5 MILLIGRAM(S): 5 TABLET ORAL at 14:31

## 2023-11-24 RX ADMIN — APREPITANT 40 MILLIGRAM(S): 80 CAPSULE ORAL at 06:26

## 2023-11-24 RX ADMIN — Medication 250 MILLIGRAM(S): at 06:36

## 2023-11-24 RX ADMIN — Medication 1000 MILLIGRAM(S): at 17:39

## 2023-11-24 RX ADMIN — HYDROMORPHONE HYDROCHLORIDE 4 MILLIGRAM(S): 2 INJECTION INTRAMUSCULAR; INTRAVENOUS; SUBCUTANEOUS at 21:04

## 2023-11-24 RX ADMIN — LAMOTRIGINE 100 MILLIGRAM(S): 25 TABLET, ORALLY DISINTEGRATING ORAL at 17:36

## 2023-11-24 RX ADMIN — Medication 975 MILLIGRAM(S): at 18:16

## 2023-11-24 RX ADMIN — Medication 15 MILLIGRAM(S): at 23:53

## 2023-11-24 RX ADMIN — Medication 15 MILLIGRAM(S): at 17:36

## 2023-11-24 RX ADMIN — SODIUM CHLORIDE 125 MILLILITER(S): 9 INJECTION INTRAMUSCULAR; INTRAVENOUS; SUBCUTANEOUS at 14:32

## 2023-11-24 RX ADMIN — HYDROMORPHONE HYDROCHLORIDE 0.5 MILLIGRAM(S): 2 INJECTION INTRAMUSCULAR; INTRAVENOUS; SUBCUTANEOUS at 10:46

## 2023-11-24 RX ADMIN — CELECOXIB 400 MILLIGRAM(S): 200 CAPSULE ORAL at 06:26

## 2023-11-24 RX ADMIN — HYDROMORPHONE HYDROCHLORIDE 4 MILLIGRAM(S): 2 INJECTION INTRAMUSCULAR; INTRAVENOUS; SUBCUTANEOUS at 22:04

## 2023-11-24 RX ADMIN — OXYCODONE HYDROCHLORIDE 5 MILLIGRAM(S): 5 TABLET ORAL at 15:01

## 2023-11-24 NOTE — DISCHARGE NOTE PROVIDER - NSDCMRMEDTOKEN_GEN_ALL_CORE_FT
acetaminophen 650 mg oral tablet, extended release: 2 tab(s) orally 2 times a day  aspirin 81 mg oral delayed release tablet: 1 tab(s) orally  cyanocobalamin 100 mcg oral tablet: 1 tab(s) orally once a day  escitalopram 20 mg oral tablet: 1 tab(s) orally once a day  gabapentin 800 mg oral tablet: 1 tab(s) orally once a day  ginseng 100 mg oral capsule: 2 cap(s) orally  lamoTRIgine 100 mg oral tablet: 1 tab(s) orally 2 times a day  meloxicam 15 mg oral tablet: 1 tab(s) orally once a day  mupirocin 2% topical ointment: 1 application in each nostril 2 times a day Start treatment on 11/19/23 to 11/23/23  nicotine 21 mg/24 hr transdermal film, extended release: 1 patch transdermally  Omega-3 1000 mg oral capsule: 1 cap(s) orally  omeprazole 20 mg oral delayed release tablet: 1 tab(s) orally  Suboxone 8 mg-2 mg sublingual tablet: 2 tab(s) sublingually 2 times a day  valsartan-hydrochlorothiazide 80 mg-12.5 mg oral tablet: 1 tab(s) orally   acetaminophen 325 mg oral tablet: 3 tab(s) orally every 8 hours  Aspirin Enteric Coated 81 mg oral delayed release tablet: 1 tab(s) orally 2 times a day  CeleBREX 200 mg oral capsule: 1 cap(s) orally 2 times a day  cyanocobalamin 100 mcg oral tablet: 1 tab(s) orally once a day  escitalopram 20 mg oral tablet: 1 tab(s) orally once a day  gabapentin 800 mg oral tablet: 1 tab(s) orally once a day  lamoTRIgine 100 mg oral tablet: 1 tab(s) orally 2 times a day  nicotine 21 mg/24 hr transdermal film, extended release: 1 patch transdermally  Omega-3 1000 mg oral capsule: 1 cap(s) orally  pantoprazole 40 mg oral delayed release tablet: 1 tab(s) orally once a day  Suboxone 8 mg-2 mg sublingual tablet: 2 tab(s) sublingually 2 times a day  valsartan-hydrochlorothiazide 80 mg-12.5 mg oral tablet: 1 tab(s) orally

## 2023-11-24 NOTE — PATIENT PROFILE ADULT - FALL HARM RISK - HARM RISK INTERVENTIONS
Assistance with ambulation/Assistance OOB with selected safe patient handling equipment/Communicate Risk of Fall with Harm to all staff/Discuss with provider need for PT consult/Monitor gait and stability/Provide patient with walking aids - walker, cane, crutches/Reinforce activity limits and safety measures with patient and family/Sit up slowly, dangle for a short time, stand at bedside before walking/Tailored Fall Risk Interventions/Use of alarms - bed, chair and/or voice tab/Visual Cue: Yellow wristband and red socks/Bed in lowest position, wheels locked, appropriate side rails in place/Call bell, personal items and telephone in reach/Instruct patient to call for assistance before getting out of bed or chair/Non-slip footwear when patient is out of bed/[place] to call system/Physically safe environment - no spills, clutter or unnecessary equipment/Purposeful Proactive Rounding/Room/bathroom lighting operational, light cord in reach

## 2023-11-24 NOTE — PHYSICAL THERAPY INITIAL EVALUATION ADULT - ADDITIONAL COMMENTS
Pt reports living with cousin in a house with 2 MICHELINE c rail, then 2+5 step c rail to bedroom. Pt amb with RW or SAC as needed and is independent with functional mobility, ADLs, and IADLs. Pt drives and is retired. Pt has support of family. Pt owns RW and SAC.

## 2023-11-24 NOTE — DISCHARGE NOTE PROVIDER - NSDCCPCAREPLAN_GEN_ALL_CORE_FT
PRINCIPAL DISCHARGE DIAGNOSIS  Diagnosis: Unilateral osteoarthritis of hip, left  Assessment and Plan of Treatment:

## 2023-11-24 NOTE — DISCHARGE NOTE PROVIDER - CARE PROVIDER_API CALL
Preet Billy  Orthopaedic Surgery  [address] [place] [id number]  Phone: [phone]  Fax: [phone]  Follow Up Time:

## 2023-11-24 NOTE — CONSULT NOTE ADULT - SUBJECTIVE AND OBJECTIVE BOX
62 y/o female with Hx of  CAD. CABG, HTN, HLD, anemia, thyroid disorder, heart valve disease, OA, she has longstanding hip pain, now progressively worsening within the last one year, she came in here for elective  left posterior total hip replacement on 23 with Dr. Billy,       Patient tolerated procedure well, patient's pain is well controlled, he has no chest pain, sob, dizziness, fever, chills, nausea, vomiting.     Allergies:  	morphine: Drug, Urticaria, Anaphylaxis  	Reglan: Drug, Hives, Anaphylaxis  	kiwi [freetext allergy; no alerts]: Food, Angioedema, kiwi      PAST MEDICAL HISTORY:  Anemia     CAD (coronary artery disease)     Chronic back pain greater than 3 months duration     Depression     Heart attack     Herniated nucleus pulposus     High cholesterol     Hypertension     Hyperthyroidism     Meningitis     Meningitis     Migraine     Mitral regurgitation     Opiate abuse, continuous 4 years ago    Pulmonary hypertension     Tricuspid regurgitation     Unilateral primary osteoarthritis, left hip.     PAST SURGICAL HISTORY:  History of cholecystectomy     S/P appendectomy     S/P CABG (coronary artery bypass graft)     S/P  section     S/P hysterectomy     S/P shoulder replacement, right.     FAMILY HISTORY:  FHx: esophageal cancer  FHx: lung cancer.      Social History:   Not a smoker, drinker or using any drugs     Home Medications:   * Incomplete Medication History as of 2023 14:08 documented in Structured Notes  · 	Suboxone 8 mg-2 mg sublingual tablet: 2 tab(s) sublingually 2 times a day  · 	escitalopram 20 mg oral tablet: Last Dose Taken:  , 1 tab(s) orally once a day  · 	meloxicam 15 mg oral tablet: 1 tab(s) orally once a day  · 	ginseng 100 mg oral capsule: Last Dose Taken:  , 2 cap(s) orally  · 	valsartan-hydrochlorothiazide 80 mg-12.5 mg oral tablet: 1 tab(s) orally  · 	lamoTRIgine 100 mg oral tablet: Last Dose Taken:  , 1 tab(s) orally 2 times a day  · 	gabapentin 800 mg oral tablet: Last Dose Taken:  , 1 tab(s) orally once a day  · 	Omega-3 1000 mg oral capsule: 1 cap(s) orally  · 	cyanocobalamin 100 mcg oral tablet: Last Dose Taken:  , 1 tab(s) orally once a day  · 	omeprazole 20 mg oral delayed release tablet: 1 tab(s) orally  · 	nicotine 21 mg/24 hr transdermal film, extended release: 1 patch transdermally  · 	aspirin 81 mg oral delayed release tablet: Last Dose Taken:  , 1 tab(s) orally  · 	acetaminophen 650 mg oral tablet, extended release: Last Dose Taken:  , 2 tab(s) orally 2 times a day      Vital Signs Last 24 Hrs  T(C): 36.3 (2023 09:28), Max: 36.6 (2023 05:56)  T(F): 97.4 (2023 09:28), Max: 97.9 (2023 05:56)  HR: 71 (2023 13:15) (68 - 80)  BP: 11/57 (2023 13:15) (11/57 - 120/61)  BP(mean): 70 (2023 13:15) (68 - 83)  RR: 17 (2023 13:15) (12 - 21)  SpO2: 100% (2023 13:15) (95% - 100%)    Parameters below as of 2023 13:15  Patient On (Oxygen Delivery Method): room air        PHYSICAL EXAM:    GENERAL: Middle age female looking comfortable   HEENT: PERRL, +EOMI  NECK: soft, Supple, No JVD   CHEST/LUNG: Clear to auscultate bilaterally; No wheezing  HEART: S1S2+, Regular rate and rhythm; No murmurs  ABDOMEN: Soft, Nontender, Nondistended; Bowel sounds present  EXTREMITIES:  1+ Peripheral Pulses, No edema  SKIN: No rashes or lesions  NEURO: AAOX3  PSYCH: normal mood

## 2023-11-24 NOTE — DISCHARGE NOTE PROVIDER - NSDCFUADDINST_GEN_ALL_CORE_FT
The patient will be seen in the office between 2-3 weeks for wound check.   **Your first post-operative visit has been scheduled prior to your admission. PLEASE CONTACT OFFICE TO CONFIRM THE APPOINTMENT DATE. Sutures/Staples/Tape will be removed at that time.  **  The silver based dressing is to be removed 7 days from the date of surgery.   ** CONTACT THE OFFICE IF THE FOLLOWING DEVELOP:  - the dressing becomes soiled or saturated  - you develop a fever greater that 101F  - the wound becomes red or you develop blistering around the wound  * Patient may shower after post-op day #3.   * The patient will continue home PT consistent with posterior total hip replacement protocol and will continue to practice posterior total hip precautions for a minimum of 6 week. Transition to outpatient PT will occur at the time of the first office visit.   * The patient is FULL weight bearing.  * The patient will continue ASPIRIN for 6 weeks after surgery for blood clot prevention.  ** While on aspirin, the patient will take daily omeprazole or other similar medication to protect the stomach from irritation.   * The patient will take OXYCODONE AND TYLENOL for pain control and adjust according to prescription and patient needs. Contact the office if pain increases while taking prescribed pain medications or related concerns develop.  * Celebrex will be taken twice daily for 3 weeks for pain control and prevention of excessive bone growth. Additional prescription may be requested at your office follow-up visit.  * The patient will take Senna S while taking oxycodone to prevent narcotic associated constipation.  Additionally, increase water intake (drink at least 8 glasses of water daily) and try adding fiber to the diet by eating fruits, vegetables and foods that are rich in grains. If constipation is experienced, contact the medical/primary care provider to discuss further treatment options.  * To avoid injury at home:  - continue use of rolling walker until cleared by physical therapist  - have family or friend remove all throw rug or objects in hallways that may present a trip hazard.  - if you experience any dizziness or medical concerns, call your medical doctor or  911.  * The implant may activate metal detection devices.   The patient will be seen in the office between 2-3 weeks for wound check.   **Your first post-operative visit has been scheduled prior to your admission. PLEASE CONTACT OFFICE TO CONFIRM THE APPOINTMENT DATE. Tape will be removed at that time.  **  The silver based dressing is to be removed 7 days from the date of surgery.   ** CONTACT THE OFFICE IF THE FOLLOWING DEVELOP:  - the dressing becomes soiled or saturated  - you develop a fever greater that 101F  - the wound becomes red or you develop blistering around the wound  * Patient may shower after post-op day #3.   * The patient will continue home PT consistent with posterior total hip replacement protocol and will continue to practice posterior total hip precautions for a minimum of 6 week. Transition to outpatient PT will occur at the time of the first office visit.   * The patient is FULL weight bearing.  * The patient will continue ASPIRIN for 6 weeks after surgery for blood clot prevention.  ** While on aspirin, the patient will take daily omeprazole or other similar medication to protect the stomach from irritation.   * The patient will take TYLENOL for pain control and adjust according to prescription and patient needs. Contact the office if pain increases while taking prescribed pain medications or related concerns develop.  * Celebrex will be taken twice daily for 3 weeks for pain control and prevention of excessive bone growth. Additional prescription may be requested at your office follow-up visit.  * The patient will take Senna S while taking oxycodone to prevent narcotic associated constipation.  Additionally, increase water intake (drink at least 8 glasses of water daily) and try adding fiber to the diet by eating fruits, vegetables and foods that are rich in grains. If constipation is experienced, contact the medical/primary care provider to discuss further treatment options.  * To avoid injury at home:  - continue use of rolling walker until cleared by physical therapist  - have family or friend remove all throw rug or objects in hallways that may present a trip hazard.  - if you experience any dizziness or medical concerns, call your medical doctor or  911.  * The implant may activate metal detection devices.

## 2023-11-24 NOTE — DISCHARGE NOTE PROVIDER - HOSPITAL COURSE
The patient underwent a left POSTERIOR TOTAL HIP REPLACEMENT on 11/24/23. The patient received antibiotics consistent with SCIP guidelines. The patient underwent the procedure and had no intra-operative complications. Post-operatively, the patient was seen by medicine and PT. The patient received Aspirin for DVTP. The patient received pain medications per pain management consultation and the pain was appropriately controlled. Patient was instructed on posterior total hip precautions by PT. The patient did not have any post-operative medical complications. The patient was discharged in stable condition.

## 2023-11-24 NOTE — CONSULT NOTE ADULT - ASSESSMENT
62 y/o female with Hx of  CAD. CABG, HTN, HLD, anemia, thyroid disorder, heart valve disease, OA, she has longstanding hip pain, now progressively worsening within the last one year, she came in here for elective  left posterior total hip replacement on 11/24/23 with Dr. Billy s/p procedure.       Plan:     OA of the left hip s/p THR post op day 0:     - post op no complications  - VS stable  - abx per ortho   - c/w anti hypertensive to be restarted post op day 02 except if blood pressure goes >150 systolic   - c/w IVF x 24 hrs then reassess per ortho  - opiate induced constipation regimen   - encouraging incentive spirometry   -c/w local wound care per ortho   -DVT prophylaxis and Pain meds as per Ortho team   -PT/OT and weight bearing per ortho   will continue with her Suboxone 8 mg-2 mg sublingual 2 tab(s) sublingually 2 times a day  Pain management consult if pain is not well controlled   gabapentin 800 mg once a day    Anxiety: will continue with escitalopram 20 mg once a day     Hx of Meningitis in past: on lamoTRIgine 100 mg 2 times a day, 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     Hx of CAD: on aspirin 81 mg daily.     Hx of Anemia: preop labs with Hb ~10, will monitor CBC

## 2023-11-24 NOTE — DISCHARGE NOTE PROVIDER - NSDCHHASSISTDEVIC_GEN_ALL_CORE_FT
s/p left REID
[FreeTextEntry1] : 2/18/22\par Plan - d/c vac, start alginate packing, 3x/week, cavilon to periwound\par offload heels and hips/sacral\par c/w supplements\par follow up 3 weeks TEB

## 2023-11-24 NOTE — ASU PREOP CHECKLIST - VIA
Pt called and asked to speak to RN ASAP because he had another outbreak.Please advise [phone]   stretcher

## 2023-11-24 NOTE — DISCHARGE NOTE NURSING/CASE MANAGEMENT/SOCIAL WORK - PATIENT PORTAL LINK FT
You can access the FollowMyHealth Patient Portal offered by [facility] by registering at the following website: [email or web address]. By joining [employer]’s FollowMyHealth portal, you will also be able to view your health information using other applications (apps) compatible with our system.

## 2023-11-24 NOTE — DISCHARGE NOTE NURSING/CASE MANAGEMENT/SOCIAL WORK - NSDCPEFALRISK_GEN_ALL_CORE
For information on Fall & Injury Prevention, visit: [email or web address] OR  [email or web address] OR  https://www.cdc.gov/steadi/patient.html

## 2023-11-24 NOTE — PHYSICAL THERAPY INITIAL EVALUATION ADULT - RANGE OF MOTION EXAMINATION, REHAB EVAL
except left hip limited by posterior THPs/bilateral upper extremity ROM was WFL (within functional limits)/bilateral lower extremity ROM was WFL (within functional limits)

## 2023-11-24 NOTE — DISCHARGE NOTE PROVIDER - NSDCFUSCHEDAPPT_GEN_ALL_CORE_FT
Preet Billy  [facility]  ORTHOSURG [address]  Scheduled Appointment: 11/24/2023    [facility]  CARDIOLOGY [address]  Scheduled Appointment: 01/29/2024    Antelmo Tierney  [facility]  CARDIOLOGY [address]  Scheduled Appointment: 02/05/2024     [facility]  CARDIOLOGY [address]  Scheduled Appointment: 01/29/2024    Antelmo Tierney  [facility]  CARDIOLOGY [address]  Scheduled Appointment: 02/05/2024

## 2023-11-25 LAB
ANION GAP SERPL CALC-SCNC: 11 MMOL/L — SIGNIFICANT CHANGE UP (ref 5–17)
ANION GAP SERPL CALC-SCNC: 11 MMOL/L — SIGNIFICANT CHANGE UP (ref 5–17)
BUN SERPL-MCNC: 14.8 MG/DL — SIGNIFICANT CHANGE UP (ref 8–20)
BUN SERPL-MCNC: 14.8 MG/DL — SIGNIFICANT CHANGE UP (ref 8–20)
CALCIUM SERPL-MCNC: 8.5 MG/DL — SIGNIFICANT CHANGE UP (ref 8.4–10.5)
CALCIUM SERPL-MCNC: 8.5 MG/DL — SIGNIFICANT CHANGE UP (ref 8.4–10.5)
CHLORIDE SERPL-SCNC: 105 MMOL/L — SIGNIFICANT CHANGE UP (ref 96–108)
CHLORIDE SERPL-SCNC: 105 MMOL/L — SIGNIFICANT CHANGE UP (ref 96–108)
CO2 SERPL-SCNC: 21 MMOL/L — LOW (ref 22–29)
CO2 SERPL-SCNC: 21 MMOL/L — LOW (ref 22–29)
CREAT SERPL-MCNC: 0.57 MG/DL — SIGNIFICANT CHANGE UP (ref 0.5–1.3)
CREAT SERPL-MCNC: 0.57 MG/DL — SIGNIFICANT CHANGE UP (ref 0.5–1.3)
EGFR: 103 ML/MIN/1.73M2 — SIGNIFICANT CHANGE UP
EGFR: 103 ML/MIN/1.73M2 — SIGNIFICANT CHANGE UP
GLUCOSE SERPL-MCNC: 90 MG/DL — SIGNIFICANT CHANGE UP (ref 70–99)
GLUCOSE SERPL-MCNC: 90 MG/DL — SIGNIFICANT CHANGE UP (ref 70–99)
HCT VFR BLD CALC: 24.6 % — LOW (ref 34.5–45)
HCT VFR BLD CALC: 24.6 % — LOW (ref 34.5–45)
HGB BLD-MCNC: 8.3 G/DL — LOW (ref 11.5–15.5)
HGB BLD-MCNC: 8.3 G/DL — LOW (ref 11.5–15.5)
MCHC RBC-ENTMCNC: 31.3 PG — SIGNIFICANT CHANGE UP (ref 27–34)
MCHC RBC-ENTMCNC: 31.3 PG — SIGNIFICANT CHANGE UP (ref 27–34)
MCHC RBC-ENTMCNC: 33.7 GM/DL — SIGNIFICANT CHANGE UP (ref 32–36)
MCHC RBC-ENTMCNC: 33.7 GM/DL — SIGNIFICANT CHANGE UP (ref 32–36)
MCV RBC AUTO: 92.8 FL — SIGNIFICANT CHANGE UP (ref 80–100)
MCV RBC AUTO: 92.8 FL — SIGNIFICANT CHANGE UP (ref 80–100)
PLATELET # BLD AUTO: 202 K/UL — SIGNIFICANT CHANGE UP (ref 150–400)
PLATELET # BLD AUTO: 202 K/UL — SIGNIFICANT CHANGE UP (ref 150–400)
POTASSIUM SERPL-MCNC: 4.6 MMOL/L — SIGNIFICANT CHANGE UP (ref 3.5–5.3)
POTASSIUM SERPL-MCNC: 4.6 MMOL/L — SIGNIFICANT CHANGE UP (ref 3.5–5.3)
POTASSIUM SERPL-SCNC: 4.6 MMOL/L — SIGNIFICANT CHANGE UP (ref 3.5–5.3)
POTASSIUM SERPL-SCNC: 4.6 MMOL/L — SIGNIFICANT CHANGE UP (ref 3.5–5.3)
RBC # BLD: 2.65 M/UL — LOW (ref 3.8–5.2)
RBC # BLD: 2.65 M/UL — LOW (ref 3.8–5.2)
RBC # FLD: 12.5 % — SIGNIFICANT CHANGE UP (ref 10.3–14.5)
RBC # FLD: 12.5 % — SIGNIFICANT CHANGE UP (ref 10.3–14.5)
SODIUM SERPL-SCNC: 137 MMOL/L — SIGNIFICANT CHANGE UP (ref 135–145)
SODIUM SERPL-SCNC: 137 MMOL/L — SIGNIFICANT CHANGE UP (ref 135–145)
WBC # BLD: 8.77 K/UL — SIGNIFICANT CHANGE UP (ref 3.8–10.5)
WBC # BLD: 8.77 K/UL — SIGNIFICANT CHANGE UP (ref 3.8–10.5)
WBC # FLD AUTO: 8.77 K/UL — SIGNIFICANT CHANGE UP (ref 3.8–10.5)
WBC # FLD AUTO: 8.77 K/UL — SIGNIFICANT CHANGE UP (ref 3.8–10.5)

## 2023-11-25 PROCEDURE — 99232 SBSQ HOSP IP/OBS MODERATE 35: CPT

## 2023-11-25 RX ORDER — GABAPENTIN 400 MG/1
800 CAPSULE ORAL THREE TIMES A DAY
Refills: 0 | Status: DISCONTINUED | OUTPATIENT
Start: 2023-11-25 | End: 2023-11-26

## 2023-11-25 RX ADMIN — ESCITALOPRAM OXALATE 20 MILLIGRAM(S): 10 TABLET, FILM COATED ORAL at 13:00

## 2023-11-25 RX ADMIN — Medication 15 MILLIGRAM(S): at 13:15

## 2023-11-25 RX ADMIN — HYDROMORPHONE HYDROCHLORIDE 4 MILLIGRAM(S): 2 INJECTION INTRAMUSCULAR; INTRAVENOUS; SUBCUTANEOUS at 02:51

## 2023-11-25 RX ADMIN — GABAPENTIN 800 MILLIGRAM(S): 400 CAPSULE ORAL at 20:05

## 2023-11-25 RX ADMIN — OXYCODONE HYDROCHLORIDE 10 MILLIGRAM(S): 5 TABLET ORAL at 09:56

## 2023-11-25 RX ADMIN — Medication 1000 MILLIGRAM(S): at 06:58

## 2023-11-25 RX ADMIN — Medication 15 MILLIGRAM(S): at 14:15

## 2023-11-25 RX ADMIN — GABAPENTIN 800 MILLIGRAM(S): 400 CAPSULE ORAL at 12:59

## 2023-11-25 RX ADMIN — Medication 15 MILLIGRAM(S): at 06:57

## 2023-11-25 RX ADMIN — Medication 15 MILLIGRAM(S): at 00:53

## 2023-11-25 RX ADMIN — Medication 81 MILLIGRAM(S): at 17:36

## 2023-11-25 RX ADMIN — Medication 975 MILLIGRAM(S): at 16:32

## 2023-11-25 RX ADMIN — LAMOTRIGINE 100 MILLIGRAM(S): 25 TABLET, ORALLY DISINTEGRATING ORAL at 05:57

## 2023-11-25 RX ADMIN — HYDROMORPHONE HYDROCHLORIDE 4 MILLIGRAM(S): 2 INJECTION INTRAMUSCULAR; INTRAVENOUS; SUBCUTANEOUS at 06:04

## 2023-11-25 RX ADMIN — Medication 975 MILLIGRAM(S): at 21:37

## 2023-11-25 RX ADMIN — PANTOPRAZOLE SODIUM 40 MILLIGRAM(S): 20 TABLET, DELAYED RELEASE ORAL at 05:57

## 2023-11-25 RX ADMIN — Medication 975 MILLIGRAM(S): at 15:32

## 2023-11-25 RX ADMIN — Medication 975 MILLIGRAM(S): at 22:00

## 2023-11-25 RX ADMIN — Medication 81 MILLIGRAM(S): at 05:57

## 2023-11-25 RX ADMIN — HYDROMORPHONE HYDROCHLORIDE 4 MILLIGRAM(S): 2 INJECTION INTRAMUSCULAR; INTRAVENOUS; SUBCUTANEOUS at 07:04

## 2023-11-25 RX ADMIN — HYDROMORPHONE HYDROCHLORIDE 4 MILLIGRAM(S): 2 INJECTION INTRAMUSCULAR; INTRAVENOUS; SUBCUTANEOUS at 01:51

## 2023-11-25 RX ADMIN — Medication 400 MILLIGRAM(S): at 05:58

## 2023-11-25 RX ADMIN — Medication 15 MILLIGRAM(S): at 05:57

## 2023-11-25 RX ADMIN — OXYCODONE HYDROCHLORIDE 10 MILLIGRAM(S): 5 TABLET ORAL at 08:56

## 2023-11-25 RX ADMIN — LAMOTRIGINE 100 MILLIGRAM(S): 25 TABLET, ORALLY DISINTEGRATING ORAL at 17:36

## 2023-11-26 VITALS
TEMPERATURE: 98 F | HEART RATE: 98 BPM | DIASTOLIC BLOOD PRESSURE: 63 MMHG | OXYGEN SATURATION: 95 % | RESPIRATION RATE: 18 BRPM | SYSTOLIC BLOOD PRESSURE: 115 MMHG

## 2023-11-26 LAB
ANION GAP SERPL CALC-SCNC: 12 MMOL/L — SIGNIFICANT CHANGE UP (ref 5–17)
ANION GAP SERPL CALC-SCNC: 12 MMOL/L — SIGNIFICANT CHANGE UP (ref 5–17)
BUN SERPL-MCNC: 10.5 MG/DL — SIGNIFICANT CHANGE UP (ref 8–20)
BUN SERPL-MCNC: 10.5 MG/DL — SIGNIFICANT CHANGE UP (ref 8–20)
CALCIUM SERPL-MCNC: 8.9 MG/DL — SIGNIFICANT CHANGE UP (ref 8.4–10.5)
CALCIUM SERPL-MCNC: 8.9 MG/DL — SIGNIFICANT CHANGE UP (ref 8.4–10.5)
CHLORIDE SERPL-SCNC: 101 MMOL/L — SIGNIFICANT CHANGE UP (ref 96–108)
CHLORIDE SERPL-SCNC: 101 MMOL/L — SIGNIFICANT CHANGE UP (ref 96–108)
CO2 SERPL-SCNC: 23 MMOL/L — SIGNIFICANT CHANGE UP (ref 22–29)
CO2 SERPL-SCNC: 23 MMOL/L — SIGNIFICANT CHANGE UP (ref 22–29)
CREAT SERPL-MCNC: 0.62 MG/DL — SIGNIFICANT CHANGE UP (ref 0.5–1.3)
CREAT SERPL-MCNC: 0.62 MG/DL — SIGNIFICANT CHANGE UP (ref 0.5–1.3)
EGFR: 101 ML/MIN/1.73M2 — SIGNIFICANT CHANGE UP
EGFR: 101 ML/MIN/1.73M2 — SIGNIFICANT CHANGE UP
GLUCOSE SERPL-MCNC: 103 MG/DL — HIGH (ref 70–99)
GLUCOSE SERPL-MCNC: 103 MG/DL — HIGH (ref 70–99)
HCT VFR BLD CALC: 25.2 % — LOW (ref 34.5–45)
HCT VFR BLD CALC: 25.2 % — LOW (ref 34.5–45)
HGB BLD-MCNC: 8.2 G/DL — LOW (ref 11.5–15.5)
HGB BLD-MCNC: 8.2 G/DL — LOW (ref 11.5–15.5)
MCHC RBC-ENTMCNC: 30.9 PG — SIGNIFICANT CHANGE UP (ref 27–34)
MCHC RBC-ENTMCNC: 30.9 PG — SIGNIFICANT CHANGE UP (ref 27–34)
MCHC RBC-ENTMCNC: 32.5 GM/DL — SIGNIFICANT CHANGE UP (ref 32–36)
MCHC RBC-ENTMCNC: 32.5 GM/DL — SIGNIFICANT CHANGE UP (ref 32–36)
MCV RBC AUTO: 95.1 FL — SIGNIFICANT CHANGE UP (ref 80–100)
MCV RBC AUTO: 95.1 FL — SIGNIFICANT CHANGE UP (ref 80–100)
PLATELET # BLD AUTO: 200 K/UL — SIGNIFICANT CHANGE UP (ref 150–400)
PLATELET # BLD AUTO: 200 K/UL — SIGNIFICANT CHANGE UP (ref 150–400)
POTASSIUM SERPL-MCNC: 4.1 MMOL/L — SIGNIFICANT CHANGE UP (ref 3.5–5.3)
POTASSIUM SERPL-MCNC: 4.1 MMOL/L — SIGNIFICANT CHANGE UP (ref 3.5–5.3)
POTASSIUM SERPL-SCNC: 4.1 MMOL/L — SIGNIFICANT CHANGE UP (ref 3.5–5.3)
POTASSIUM SERPL-SCNC: 4.1 MMOL/L — SIGNIFICANT CHANGE UP (ref 3.5–5.3)
RBC # BLD: 2.65 M/UL — LOW (ref 3.8–5.2)
RBC # BLD: 2.65 M/UL — LOW (ref 3.8–5.2)
RBC # FLD: 12.4 % — SIGNIFICANT CHANGE UP (ref 10.3–14.5)
RBC # FLD: 12.4 % — SIGNIFICANT CHANGE UP (ref 10.3–14.5)
SODIUM SERPL-SCNC: 136 MMOL/L — SIGNIFICANT CHANGE UP (ref 135–145)
SODIUM SERPL-SCNC: 136 MMOL/L — SIGNIFICANT CHANGE UP (ref 135–145)
WBC # BLD: 7.5 K/UL — SIGNIFICANT CHANGE UP (ref 3.8–10.5)
WBC # BLD: 7.5 K/UL — SIGNIFICANT CHANGE UP (ref 3.8–10.5)
WBC # FLD AUTO: 7.5 K/UL — SIGNIFICANT CHANGE UP (ref 3.8–10.5)
WBC # FLD AUTO: 7.5 K/UL — SIGNIFICANT CHANGE UP (ref 3.8–10.5)

## 2023-11-26 PROCEDURE — 97116 GAIT TRAINING THERAPY: CPT

## 2023-11-26 PROCEDURE — C1713: CPT

## 2023-11-26 PROCEDURE — 85027 COMPLETE CBC AUTOMATED: CPT

## 2023-11-26 PROCEDURE — 97110 THERAPEUTIC EXERCISES: CPT

## 2023-11-26 PROCEDURE — C1776: CPT

## 2023-11-26 PROCEDURE — 80048 BASIC METABOLIC PNL TOTAL CA: CPT

## 2023-11-26 PROCEDURE — 73502 X-RAY EXAM HIP UNI 2-3 VIEWS: CPT

## 2023-11-26 PROCEDURE — 99222 1ST HOSP IP/OBS MODERATE 55: CPT

## 2023-11-26 PROCEDURE — 36415 COLL VENOUS BLD VENIPUNCTURE: CPT

## 2023-11-26 RX ORDER — PANTOPRAZOLE SODIUM 20 MG/1
1 TABLET, DELAYED RELEASE ORAL
Qty: 42 | Refills: 0
Start: 2023-11-26 | End: 2024-01-06

## 2023-11-26 RX ORDER — OMEPRAZOLE 10 MG/1
1 CAPSULE, DELAYED RELEASE ORAL
Refills: 0 | DISCHARGE

## 2023-11-26 RX ORDER — PANAX, AMERICAN GINSG/B12/ROYL 150-25-25
2 CAPSULE ORAL
Refills: 0 | DISCHARGE

## 2023-11-26 RX ORDER — ASPIRIN/CALCIUM CARB/MAGNESIUM 324 MG
1 TABLET ORAL
Qty: 84 | Refills: 0
Start: 2023-11-26 | End: 2024-01-06

## 2023-11-26 RX ORDER — CELECOXIB 200 MG/1
1 CAPSULE ORAL
Qty: 42 | Refills: 0
Start: 2023-11-26 | End: 2023-12-16

## 2023-11-26 RX ORDER — ACETAMINOPHEN 500 MG
2 TABLET ORAL
Refills: 0 | DISCHARGE

## 2023-11-26 RX ORDER — ASPIRIN/CALCIUM CARB/MAGNESIUM 324 MG
1 TABLET ORAL
Refills: 0 | DISCHARGE

## 2023-11-26 RX ORDER — ACETAMINOPHEN 500 MG
3 TABLET ORAL
Qty: 0 | Refills: 0 | DISCHARGE
Start: 2023-11-26

## 2023-11-26 RX ORDER — MELOXICAM 15 MG/1
1 TABLET ORAL
Refills: 0 | DISCHARGE

## 2023-11-26 RX ADMIN — Medication 975 MILLIGRAM(S): at 06:00

## 2023-11-26 RX ADMIN — VALSARTAN 80 MILLIGRAM(S): 80 TABLET ORAL at 05:44

## 2023-11-26 RX ADMIN — CELECOXIB 200 MILLIGRAM(S): 200 CAPSULE ORAL at 05:44

## 2023-11-26 RX ADMIN — ESCITALOPRAM OXALATE 20 MILLIGRAM(S): 10 TABLET, FILM COATED ORAL at 12:10

## 2023-11-26 RX ADMIN — CELECOXIB 200 MILLIGRAM(S): 200 CAPSULE ORAL at 06:00

## 2023-11-26 RX ADMIN — PANTOPRAZOLE SODIUM 40 MILLIGRAM(S): 20 TABLET, DELAYED RELEASE ORAL at 05:47

## 2023-11-26 RX ADMIN — GABAPENTIN 800 MILLIGRAM(S): 400 CAPSULE ORAL at 05:45

## 2023-11-26 RX ADMIN — LAMOTRIGINE 100 MILLIGRAM(S): 25 TABLET, ORALLY DISINTEGRATING ORAL at 05:45

## 2023-11-26 RX ADMIN — Medication 81 MILLIGRAM(S): at 05:44

## 2023-11-26 RX ADMIN — Medication 975 MILLIGRAM(S): at 05:43

## 2023-11-26 RX ADMIN — Medication 975 MILLIGRAM(S): at 13:40

## 2023-11-26 NOTE — PROGRESS NOTE ADULT - ASSESSMENT
62 y/o female with Hx of  CAD. CABG, HTN, HLD, anemia, thyroid disorder, heart valve disease, OA, she has longstanding hip pain, now progressively worsening within the last one year, she came in here for elective  left posterior total hip replacement on 11/24/23 with Dr. Billy s/p procedure.       Plan:     OA of the left hip s/p THR post op day 01:     - post op no complications  - VS stable  - abx per ortho   - opiate induced constipation regimen   - encouraging incentive spirometry   -c/w local wound care per ortho   -DVT prophylaxis and Pain meds as per Ortho team   -PT/OT and weight bearing per ortho   will continue with her Suboxone 8 mg-2 mg sublingual 2 tab(s) sublingually 2 times a day  Pain management consult if pain is not well controlled   gabapentin 800 mg once a day    Anxiety: will continue with escitalopram 20 mg once a day     Hx of Meningitis in past: on lamotrigine 100 mg 2 times a day, 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     Hx of CAD: on aspirin 81 mg daily.     Hx of Anemia: preop labs with Hb ~10, now hb is 8.3, will monitor CBC, if trending down, will transfuse 
64 y/o female with Hx of  CAD. CABG, HTN, HLD, anemia, thyroid disorder, heart valve disease, OA, she has longstanding hip pain, now progressively worsening within the last one year, she came in here for elective  left posterior total hip replacement on 11/24/23 with Dr. Billy s/p procedure.       Plan:     OA of the left hip s/p THR post op day 02:   PT/OT/pain mgmt  DVT prophylaxis- as per ortho  Abx as per SCIP- given  Incentive spirometry  Prophylaxis of opioid  induced constipation.  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 Advised to follow up with PMD for further w/u of anemia and to take PO iron     DVT prophylaxis  - as per ortho protocol  Opioid induced constipation  prophylaxis - bowel regimen     Dispo plan ois Home - likely today.    Medically stable to d/c once cleared by physical therapy / ortho .

## 2023-11-26 NOTE — PROGRESS NOTE ADULT - SUBJECTIVE AND OBJECTIVE BOX
DUKE GARCIA    [id number]    History: Patient is status post left posterior total hip arthroplasty POD #0. Patient is doing well and is comfortable. The patient's pain is controlled using the prescribed pain medications. The patient is participating in physical therapy. Denies CP, SOB, dizziness, HA, fever/chills, numbness or tingling. No new complaints.    Vital Signs Last 24 Hrs  T(C): 36.4 (24 Nov 2023 13:51), Max: 36.6 (24 Nov 2023 05:56)  T(F): 97.6 (24 Nov 2023 13:51), Max: 97.9 (24 Nov 2023 05:56)  HR: 70 (24 Nov 2023 13:51) (68 - 80)  BP: 96/58 (24 Nov 2023 13:51) (11/57 - 120/61)  BP(mean): 70 (24 Nov 2023 13:15) (68 - 83)  RR: 15 (24 Nov 2023 13:51) (12 - 21)  SpO2: 99% (24 Nov 2023 13:51) (95% - 100%)    Parameters below as of 24 Nov 2023 13:51  Patient On (Oxygen Delivery Method): room air          General: Alert, awake, NAD, abduction pillow in place  Physical exam: The left hip dressing is clean, dry and intact. No drainage, discharge, erythema, blistering or ecchymosis. The calf is supple nontender b/l. SILT. +EHL/FHL/AT/GC. No foot drop. 2+ DP pulse. BCR. No cyanosis.      Plan:   - DVT prophylaxis as prescribed, including use of compression devices and ankle pumps  -  Continue physical therapy  - Weight bearing status of surgical extremity: WBAT  - Incentive spirometry encouraged  - Pain control as clinically indicated  - Posterior hip precautions reviewed with patient  - Discharge planning   - f/u AM labs
  DUKE GARCIA    [id number]    History: Patient is status post left posterior total hip arthroplasty POD # 2. Patient is doing well and is comfortable. The patient's pain is controlled using the prescribed pain medications. The patient is participating in physical therapy. Denies CP, SOB, dizziness, HA, fever/chills, numbness or tingling. No new complaints.    Vital Signs Last 24 Hrs  T(C): 37.1 (26 Nov 2023 05:00), Max: 37.1 (26 Nov 2023 05:00)  T(F): 98.7 (26 Nov 2023 05:00), Max: 98.7 (26 Nov 2023 05:00)  HR: 86 (26 Nov 2023 05:00) (80 - 86)  BP: 138/82 (26 Nov 2023 05:00) (112/62 - 138/82)  BP(mean): --  RR: 18 (26 Nov 2023 05:00) (18 - 18)  SpO2: 96% (26 Nov 2023 05:00) (96% - 99%)    Parameters below as of 26 Nov 2023 05:00  Patient On (Oxygen Delivery Method): room air                              8.3    8.77  )-----------( 202      ( 25 Nov 2023 05:15 )             24.6     11-25    137  |  105  |  14.8  ----------------------------<  90  4.6   |  21.0<L>  |  0.57    Ca    8.5      25 Nov 2023 05:15    General: Alert, awake, NAD  Physical exam: The left hip dressing is clean, dry and intact. No drainage, discharge, erythema, blistering or ecchymosis. The calf is supple nontender b/l. SILT. +EHL/FHL/AT/GC. No foot drop. 2+ DP pulse. BCR. No cyanosis.    Plan:   - DVT prophylaxis as prescribed, including use of compression devices and ankle pumps  -  Continue physical therapy  - Weight bearing status of surgical extremity: WBAT  - Incentive spirometry encouraged  - Pain control as clinically indicated  - Posterior hip precautions reviewed with patient  - Discharge planning – anticipated discharge is Home likely today  - f/u labs  
DUKE GARCIA    [id number]    63y      Female    Patient is a 63y old  Female who presents with a chief complaint of s/p left REID (24 Nov 2023 09:12)      INTERVAL HPI/OVERNIGHT EVENTS:    Patient is doing ok, pain is well managed with pain medications, has no fever, chills, chest pain, SOB, nausea, vomiting, constipation.       Vital Signs Last 24 Hrs  T(C): 36.8 (25 Nov 2023 05:08), Max: 36.8 (24 Nov 2023 22:14)  T(F): 98.3 (25 Nov 2023 05:08), Max: 98.3 (25 Nov 2023 05:08)  HR: 74 (25 Nov 2023 05:08) (66 - 78)  BP: 145/76 (25 Nov 2023 05:08) (11/57 - 145/76)  BP(mean): 70 (24 Nov 2023 13:15) (70 - 83)  RR: 18 (25 Nov 2023 05:08) (12 - 21)  SpO2: 99% (25 Nov 2023 05:08) (95% - 100%)    Parameters below as of 25 Nov 2023 05:08  Patient On (Oxygen Delivery Method): room air        PHYSICAL EXAM:      GENERAL: Middle age female looking comfortable   HEENT: PERRL, +EOMI  NECK: soft, Supple, No JVD   CHEST/LUNG: Clear to auscultate bilaterally; No wheezing  HEART: S1S2+, Regular rate and rhythm; No murmurs  ABDOMEN: Soft, Nontender, Nondistended; Bowel sounds present  EXTREMITIES:  1+ Peripheral Pulses, No edema  SKIN: No rashes or lesions  NEURO: AAOX3  PSYCH: normal mood        LABS:                        8.3    8.77  )-----------( 202      ( 25 Nov 2023 05:15 )             24.6     11-25    137  |  105  |  14.8  ----------------------------<  90  4.6   |  21.0<L>  |  0.57    Ca    8.5      25 Nov 2023 05:15            I&O's Summary    24 Nov 2023 07:01  -  25 Nov 2023 07:00  --------------------------------------------------------  IN: 2605 mL / OUT: 850 mL / NET: 1755 mL        MEDICATIONS  (STANDING):  acetaminophen     Tablet .. 975 milliGRAM(s) Oral every 8 hours  aspirin enteric coated 81 milliGRAM(s) Oral two times a day  escitalopram 20 milliGRAM(s) Oral daily  gabapentin 800 milliGRAM(s) Oral daily  ketorolac   Injectable 15 milliGRAM(s) IV Push every 6 hours  lamoTRIgine 100 milliGRAM(s) Oral two times a day  pantoprazole    Tablet 40 milliGRAM(s) Oral before breakfast  polyethylene glycol 3350 17 Gram(s) Oral at bedtime  senna 2 Tablet(s) Oral at bedtime  sodium chloride 0.9%. 1000 milliLiter(s) (125 mL/Hr) IV Continuous <Continuous>    MEDICATIONS  (PRN):  aluminum hydroxide/magnesium hydroxide/simethicone Suspension 30 milliLiter(s) Oral four times a day PRN Indigestion  HYDROmorphone   Tablet 4 milliGRAM(s) Oral every 4 hours PRN Severe Pain (7 - 10)  magnesium hydroxide Suspension 30 milliLiter(s) Oral daily PRN Constipation  ondansetron Injectable 4 milliGRAM(s) IV Push every 6 hours PRN Nausea and/or Vomiting  oxyCODONE    IR 5 milliGRAM(s) Oral every 3 hours PRN Mild Pain (1 - 3)  oxyCODONE    IR 10 milliGRAM(s) Oral every 3 hours PRN Moderate Pain (4 - 6)        
Patient seen and examined . S/p L REID  , POD # 2. Doing well , pain well controlled , denies n/v, voiding , last BM yesterday ,   participating with physical therapy .    CC : L hip chronic pain postop well controlled         MEDICATIONS  (STANDING):  acetaminophen     Tablet .. 975 milliGRAM(s) Oral every 8 hours  aspirin enteric coated 81 milliGRAM(s) Oral two times a day  celecoxib 200 milliGRAM(s) Oral every 12 hours  escitalopram 20 milliGRAM(s) Oral daily  gabapentin 800 milliGRAM(s) Oral three times a day  hydrochlorothiazide 12.5 milliGRAM(s) Oral daily  lamoTRIgine 100 milliGRAM(s) Oral two times a day  pantoprazole    Tablet 40 milliGRAM(s) Oral before breakfast  polyethylene glycol 3350 17 Gram(s) Oral at bedtime  senna 2 Tablet(s) Oral at bedtime  sodium chloride 0.9%. 1000 milliLiter(s) (125 mL/Hr) IV Continuous <Continuous>  valsartan 80 milliGRAM(s) Oral daily    MEDICATIONS  (PRN):  aluminum hydroxide/magnesium hydroxide/simethicone Suspension 30 milliLiter(s) Oral four times a day PRN Indigestion  bisacodyl Suppository 10 milliGRAM(s) Rectal once PRN Constipation  HYDROmorphone   Tablet 4 milliGRAM(s) Oral every 4 hours PRN Severe Pain (7 - 10)  magnesium hydroxide Suspension 30 milliLiter(s) Oral daily PRN Constipation  ondansetron Injectable 4 milliGRAM(s) IV Push every 6 hours PRN Nausea and/or Vomiting  oxyCODONE    IR 5 milliGRAM(s) Oral every 3 hours PRN Mild Pain (1 - 3)  oxyCODONE    IR 10 milliGRAM(s) Oral every 3 hours PRN Moderate Pain (4 - 6)      LABS:                          8.2    7.50  )-----------( 200      ( 26 Nov 2023 09:33 )             25.2     11-26    136  |  101  |  10.5  ----------------------------<  103<H>  4.1   |  23.0  |  0.62    Ca    8.9      26 Nov 2023 09:33      RADIOLOGY & ADDITIONAL TESTS:  < from: Xray Hip w/ Pelvis 2 or 3 Views, Left (11.24.23 @ 11:03) >    ACC: [id number] EXAM:  XR HIP WITH PELV 2-3V LT   ORDERED BY: RILEY ROWELL     PROCEDURE DATE:  11/24/2023          INTERPRETATION:  Clinical history: 63-year-old female, oh are.    Frontal view of the pelvis and left hip.    FINDINGS: Post left total hip arthroplasty, prosthesis in satisfactory   position.    Mild degenerative change otherwise evident.    IMPRESSION:    No acute radiographic findings    --- End of Report ---      < end of copied text >        REVIEW OF SYSTEMS:    As above all other systems are reviewed and are negative .     Vital Signs Last 24 Hrs  T(C): 37.1 (26 Nov 2023 05:00), Max: 37.1 (26 Nov 2023 05:00)  T(F): 98.7 (26 Nov 2023 05:00), Max: 98.7 (26 Nov 2023 05:00)  HR: 86 (26 Nov 2023 05:00) (85 - 86)  BP: 138/82 (26 Nov 2023 05:00) (116/68 - 138/82)  BP(mean): --  RR: 18 (26 Nov 2023 05:00) (18 - 18)  SpO2: 96% (26 Nov 2023 05:00) (96% - 99%)    Parameters below as of 26 Nov 2023 05:00  Patient On (Oxygen Delivery Method): room air      PHYSICAL EXAM:    GENERAL: NAD, well-groomed, well-developed  HEAD:  Atraumatic, Normocephalic  EYES: EOMI, PERRLA, conjunctiva and sclera clear  NECK: Supple, No JVD, Normal thyroid  NERVOUS SYSTEM:  Alert & Oriented X4, no focal deficit  CHEST/LUNG: CTA b/l ,  no  rales, rhonchi, wheezing, or rubs  HEART: Regular rate and rhythm; No murmurs, rubs, or gallops  ABDOMEN: Soft, Nontender, Nondistended; Bowel sounds present  EXTREMITIES:  2+ Peripheral Pulses, No clubbing, cyanosis, or edema,   L hip dressing + ,clean and dry   LYMPH: No lymphadenopathy noted  SKIN: No rashes or lesions  
Pelvis & hip films reviewed. Implants are in appropriate position. No fracture or dislocation noted. Patient is WBAT of the surgical extremity. 
  DUKE GARCIA    [id number]    History: Patient is status post left posterior total hip arthroplasty POD #1. Patient is doing well and is comfortable. The patient's pain is controlled using the prescribed pain medications. The patient is participating in physical therapy. Denies CP, SOB, dizziness, HA, fever/chills, numbness or tingling. No new complaints.    Vital Signs Last 24 Hrs  T(C): 36.8 (25 Nov 2023 05:08), Max: 36.8 (24 Nov 2023 22:14)  T(F): 98.3 (25 Nov 2023 05:08), Max: 98.3 (25 Nov 2023 05:08)  HR: 74 (25 Nov 2023 05:08) (66 - 80)  BP: 145/76 (25 Nov 2023 05:08) (11/57 - 145/76)  BP(mean): 70 (24 Nov 2023 13:15) (68 - 83)  RR: 18 (25 Nov 2023 05:08) (12 - 21)  SpO2: 99% (25 Nov 2023 05:08) (95% - 100%)    Parameters below as of 25 Nov 2023 05:08  Patient On (Oxygen Delivery Method): room air                              8.3    8.77  )-----------( 202      ( 25 Nov 2023 05:15 )             24.6     11-25    137  |  105  |  14.8  ----------------------------<  90  4.6   |  21.0<L>  |  0.57    Ca    8.5      25 Nov 2023 05:15        General: Alert, awake, NAD, abduction pillow in place  Physical exam: The left hip dressing is clean, dry and intact. No drainage, discharge, erythema, blistering or ecchymosis. The calf is supple nontender b/l. SILT. +EHL/FHL/AT/GC. No foot drop. 2+ DP pulse. BCR. No cyanosis.      Plan:   - DVT prophylaxis as prescribed, including use of compression devices and ankle pumps  -  Continue physical therapy  - Weight bearing status of surgical extremity: WBAT  - Incentive spirometry encouraged  - Pain control as clinically indicated  - Posterior hip precautions reviewed with patient  - Discharge planning – anticipated discharge is Home pending clearance by PT and Medicine  - f/u AM labs

## 2023-11-27 ENCOUNTER — NON-APPOINTMENT (OUTPATIENT)
Age: 63
End: 2023-11-27

## 2023-12-11 ENCOUNTER — NON-APPOINTMENT (OUTPATIENT)
Age: 63
End: 2023-12-11

## 2023-12-18 ENCOUNTER — APPOINTMENT (OUTPATIENT)
Dept: ORTHOPEDIC SURGERY | Facility: CLINIC | Age: 63
End: 2023-12-18

## 2024-01-03 ENCOUNTER — APPOINTMENT (OUTPATIENT)
Dept: ORTHOPEDIC SURGERY | Facility: CLINIC | Age: 64
End: 2024-01-03
Payer: MEDICAID

## 2024-01-03 PROCEDURE — 99024 POSTOP FOLLOW-UP VISIT: CPT

## 2024-01-03 PROCEDURE — 73502 X-RAY EXAM HIP UNI 2-3 VIEWS: CPT

## 2024-01-03 RX ORDER — CELECOXIB 200 MG/1
200 CAPSULE ORAL TWICE DAILY
Qty: 60 | Refills: 0 | Status: ACTIVE | COMMUNITY
Start: 2024-01-03 | End: 1900-01-01

## 2024-01-03 NOTE — ADDENDUM
[FreeTextEntry1] : This note was written by Donna Santos, acting as the  for Dr. Billy. This note accurately reflects the work and decisions made by Dr. Billy.

## 2024-01-03 NOTE — HISTORY OF PRESENT ILLNESS
[Procedure: ___] : status post [unfilled] [] : left Total Hip Arthroplasty [___ Weeks Post Op] : [unfilled] weeks post op [Healed] : healed [Swelling] : swollen [Xray (Date:___)] : [unfilled] Xray -  [Hardware in Good Position] : hardware in good position [No Obvious Fractures] : no obvious fractures [Good Overall Alignment] : good overall alignment [Doing Well] : is doing well [Excellent Pain Control] : has excellent pain control [No Sign of Infection] : is showing no signs of infection [Chills] : no chills [Fever] : no fever [Erythema] : not erythematous [Discharge] : absent of discharge [Dehiscence] : not dehisced [de-identified] : Status post left posterior total hip arthroplasty with moises. (DOS: 11/24/2023) [de-identified] : 63 year old female presents today 6 weeks status post left posterior total hip arthroplasty with moises. The patient underwent surgery on 11/24/2023. Overall, the patient is happy with the results of her operation. She has finished at-home physical therapy. The patient has completed her 6 week course of oral Aspirin for DVT prophylaxis. The patient ambulates normally. She has been taking Tylenol and nsaids from her surgery to maintain her pain. Denies any recent falls or trauma. [de-identified] : Left Lower Extremity: Incision is well-healed without erythema drainage or discharge, hip full flexion with 50 degree of external rotation and 30 degree of internal rotation without pain, leg lengths equal, 5 out of 5 strength for plantarflexion dorsiflexion, sensation intact to light touch over the foot, foot warm well perfused brisk capillary refill. [de-identified] : AP pelvis and 2 views of the left hip obtained in the office today show no acute fracture or dislocation. There is a left total hip arthroplasty in appropriate alignment without evidence of fracture dislocation or hardware complication. [de-identified] : 63 year old female presents today 6 weeks status post left posterior total hip arthroplasty with moises. The patient underwent surgery on 11/24/2023. Overall, I am happy with the patient's progress. I have provided the patient with a prescription to begin out-patient physical therapy.  She will continue taking Tylenol and oral opioids as needed for the pain. The patient will continue weight bearing as tolerated. She will follow-up in 6 weeks for repeat evaluation and imaging. All questions were asked and answered. The patient verbalized understanding the plan.

## 2024-01-17 ENCOUNTER — NON-APPOINTMENT (OUTPATIENT)
Age: 64
End: 2024-01-17

## 2024-01-22 ENCOUNTER — APPOINTMENT (OUTPATIENT)
Dept: ORTHOPEDIC SURGERY | Facility: CLINIC | Age: 64
End: 2024-01-22

## 2024-01-22 ENCOUNTER — APPOINTMENT (OUTPATIENT)
Dept: ORTHOPEDIC SURGERY | Facility: CLINIC | Age: 64
End: 2024-01-22
Payer: MEDICAID

## 2024-01-22 VITALS
SYSTOLIC BLOOD PRESSURE: 92 MMHG | DIASTOLIC BLOOD PRESSURE: 58 MMHG | BODY MASS INDEX: 22.15 KG/M2 | WEIGHT: 125 LBS | HEIGHT: 63 IN | HEART RATE: 96 BPM

## 2024-01-22 DIAGNOSIS — Z96.642 PRESENCE OF LEFT ARTIFICIAL HIP JOINT: ICD-10-CM

## 2024-01-22 DIAGNOSIS — M25.522 PAIN IN LEFT ELBOW: ICD-10-CM

## 2024-01-22 DIAGNOSIS — S52.022A DISPLACED FRACTURE OF OLECRANON PROCESS W/OUT INTRAARTICULAR EXTENSION OF LEFT ULNA, INITIAL ENCOUNTER FOR CLOSED FRACTURE: ICD-10-CM

## 2024-01-22 DIAGNOSIS — S52.023A DISPLACED FRACTURE OF OLECRANON PROCESS W/OUT INTRAARTICULAR EXTENSION OF UNSPECIFIED ULNA, INITIAL ENCOUNTER FOR CLOSED FRACTURE: ICD-10-CM

## 2024-01-22 PROCEDURE — 73080 X-RAY EXAM OF ELBOW: CPT | Mod: LT

## 2024-01-22 PROCEDURE — 73502 X-RAY EXAM HIP UNI 2-3 VIEWS: CPT | Mod: LT

## 2024-01-22 PROCEDURE — 29105 APPLICATION LONG ARM SPLINT: CPT | Mod: 58,LT

## 2024-01-22 PROCEDURE — 73110 X-RAY EXAM OF WRIST: CPT | Mod: LT

## 2024-01-22 PROCEDURE — 99215 OFFICE O/P EST HI 40 MIN: CPT | Mod: 25

## 2024-01-22 NOTE — REVIEW OF SYSTEMS
[Joint Pain] : joint pain [Joint Stiffness] : joint stiffness [Joint Swelling] : joint swelling [Negative] : Allergic/Immunologic [FreeTextEntry9] : left wrist pain

## 2024-01-22 NOTE — DISCUSSION/SUMMARY
[FreeTextEntry1] : Patient is a 63-year-old female here today for evaluation of left elbow pain after a fall 1 week ago as well as status post her left total hip arthroplasty.  I have reassured her that based on her x-ray and her exam her hip implants are functioning appropriately there is no evidence of hardware compromise.  She should continue with her conservative treatment and low impact activity and exercise.  As for her left elbow she does have a displaced intra-articular olecranon fracture.  We had a thorough discussion about conservative and surgical treatment options.  At this time I do think she would benefit from surgical treatment in order to repair her olecranon and improve her extensor mechanism strength as well as to prevent malunion and nonunion.  We discussed risk benefits alternatives common to blood loss infection damage neurovascular structures risk need for revision surgery risk of periprosthetic fracture risk of malunion nonunion.  Patient is in agreement wished to proceed.  We will obtain medical clearance.  I will see her back postoperatively for repeat evaluation.  She was placed in a long-arm splint with the arm in extension at her visit today.  She tolerated this well.

## 2024-01-22 NOTE — PHYSICAL EXAM
[de-identified] : Left upper extremity: Splint removed, diffuse ecchymosis is noted around the elbow, range of motion 10-90, weakness with extensor mechanism, 5 out of 5 strength in AIN PIN median radial ulnar nerves distributions, wrist full range of motion with minimal pain, mild tenderness palpation over the distal radius shoulder full range of motion without pain [de-identified] : 3 views of the left elbow obtained in the office today show a left olecranon fracture with overlying fiberglass.  3 views of the left wrist obtained the office today show no acute fracture or dislocation.  Evidence of old healed distal radius fracture noted.  3 views of the left elbow after splint removal show a left intra-articular olecranon fracture.

## 2024-01-22 NOTE — HISTORY OF PRESENT ILLNESS
[Procedure: ___] : status post [unfilled] [] : left Total Hip Arthroplasty [___ Weeks Post Op] : [unfilled] weeks post op [Healed] : healed [Swelling] : swollen [Xray (Date:___)] : [unfilled] Xray -  [Hardware in Good Position] : hardware in good position [No Obvious Fractures] : no obvious fractures [Good Overall Alignment] : good overall alignment [Doing Well] : is doing well [Excellent Pain Control] : has excellent pain control [No Sign of Infection] : is showing no signs of infection [Chills] : no chills [Fever] : no fever [Erythema] : not erythematous [Discharge] : absent of discharge [Dehiscence] : not dehisced [de-identified] : Status post left posterior total hip arthroplasty with moises. (DOS: 11/24/2023) [de-identified] : 63 year old female presents today 8.5 weeks status post left posterior total hip arthroplasty with moises. The patient underwent surgery on 11/24/2023. Overall, the patient is happy with the results of her operation. She has been attending out-patient physical therapy. The patient ambulates normally. She has been taking Tylenol and NSAIDs as needed for the pain.  Of note she did fall 1 week ago however she is not having any pain in her hip.  Does endorse pain in her elbow [de-identified] : Left Lower Extremity: Incision is well-healed without erythema drainage or discharge, hip full flexion with 50 degree of external rotation and 30 degree of internal rotation without pain, leg lengths equal, 5 out of 5 strength for plantarflexion dorsiflexion, sensation intact to light touch over the foot, foot warm well perfused brisk capillary refill. [de-identified] : AP pelvis and 2 views of the left hip obtained in the office today show no acute fracture or dislocation. There is a left total hip arthroplasty in appropriate alignment without evidence of fracture dislocation or hardware complication. [de-identified] : 63 year old female presents today 8.5 weeks status post left posterior total hip arthroplasty with moises. The patient underwent surgery on 11/24/2023. Overall, I am happy with the patient's progress. I have provided the patient with a prescription to continue out-patient physical therapy. She will continue taking Tylenol and NSAIDs as needed for the pain. The patient will continue weight bearing as tolerated. She will follow-up in 4 weeks for repeat evaluation and imaging. All questions were asked and answered. The patient verbalized understanding the plan. [FreeTextEntry1] : 63 year old female presents today for follow-up of her left wrist injury and elbow pain.  Patient has a history of a left wrist fracture many years ago.  States that she fell 1 week ago onto her left elbow.  Was seen in urgent care and placed in a splint.  States that her elbow causes severe pain.  Has difficulty with bending and straightening it.  Has noticed severe swelling.  Denies any numbness tingling or neurovascular compromise.  Denies any history of elbow fractures

## 2024-01-22 NOTE — PROCEDURE
[] : left side. The other side left free, and demonstrate good range of motion. Pressure points carefully padded. Cast instructions given. All questions answered. [unfilled] tolerated procedure well. No complications.  [FreeTextEntry1] : A long-arm fiberglass splint was applied to the left upper extremity.  Patient tolerated the procedure well.

## 2024-01-22 NOTE — HISTORY OF PRESENT ILLNESS
[Procedure: ___] : status post [unfilled] [] : left Total Hip Arthroplasty [___ Weeks Post Op] : [unfilled] weeks post op [Healed] : healed [Swelling] : swollen [Xray (Date:___)] : [unfilled] Xray -  [Hardware in Good Position] : hardware in good position [No Obvious Fractures] : no obvious fractures [Good Overall Alignment] : good overall alignment [Doing Well] : is doing well [Excellent Pain Control] : has excellent pain control [No Sign of Infection] : is showing no signs of infection [Chills] : no chills [Fever] : no fever [Erythema] : not erythematous [Discharge] : absent of discharge [Dehiscence] : not dehisced [de-identified] : Status post left posterior total hip arthroplasty with moises. (DOS: 11/24/2023) [de-identified] : 63 year old female presents today 8.5 weeks status post left posterior total hip arthroplasty with moises. The patient underwent surgery on 11/24/2023. Overall, the patient is happy with the results of her operation. She has been attending out-patient physical therapy. The patient ambulates normally. She has been taking Tylenol and NSAIDs as needed for the pain.  Of note she did fall 1 week ago however she is not having any pain in her hip.  Does endorse pain in her elbow [de-identified] : Left Lower Extremity: Incision is well-healed without erythema drainage or discharge, hip full flexion with 50 degree of external rotation and 30 degree of internal rotation without pain, leg lengths equal, 5 out of 5 strength for plantarflexion dorsiflexion, sensation intact to light touch over the foot, foot warm well perfused brisk capillary refill. [de-identified] : AP pelvis and 2 views of the left hip obtained in the office today show no acute fracture or dislocation. There is a left total hip arthroplasty in appropriate alignment without evidence of fracture dislocation or hardware complication. [de-identified] : 63 year old female presents today 8.5 weeks status post left posterior total hip arthroplasty with moises. The patient underwent surgery on 11/24/2023. Overall, I am happy with the patient's progress. I have provided the patient with a prescription to continue out-patient physical therapy. She will continue taking Tylenol and NSAIDs as needed for the pain. The patient will continue weight bearing as tolerated. She will follow-up in 4 weeks for repeat evaluation and imaging. All questions were asked and answered. The patient verbalized understanding the plan. [FreeTextEntry1] : 63 year old female presents today for follow-up of her left wrist injury and elbow pain.  Patient has a history of a left wrist fracture many years ago.  States that she fell 1 week ago onto her left elbow.  Was seen in urgent care and placed in a splint.  States that her elbow causes severe pain.  Has difficulty with bending and straightening it.  Has noticed severe swelling.  Denies any numbness tingling or neurovascular compromise.  Denies any history of elbow fractures

## 2024-01-22 NOTE — PHYSICAL EXAM
[de-identified] : Left upper extremity: Splint removed, diffuse ecchymosis is noted around the elbow, range of motion 10-90, weakness with extensor mechanism, 5 out of 5 strength in AIN PIN median radial ulnar nerves distributions, wrist full range of motion with minimal pain, mild tenderness palpation over the distal radius shoulder full range of motion without pain [de-identified] : 3 views of the left elbow obtained in the office today show a left olecranon fracture with overlying fiberglass.  3 views of the left wrist obtained the office today show no acute fracture or dislocation.  Evidence of old healed distal radius fracture noted.  3 views of the left elbow after splint removal show a left intra-articular olecranon fracture.

## 2024-01-23 ENCOUNTER — APPOINTMENT (OUTPATIENT)
Dept: ORTHOPEDIC SURGERY | Facility: CLINIC | Age: 64
End: 2024-01-23

## 2024-01-24 ENCOUNTER — NON-APPOINTMENT (OUTPATIENT)
Age: 64
End: 2024-01-24

## 2024-01-25 ENCOUNTER — NON-APPOINTMENT (OUTPATIENT)
Age: 64
End: 2024-01-25

## 2024-01-26 ENCOUNTER — APPOINTMENT (OUTPATIENT)
Dept: ORTHOPEDIC SURGERY | Facility: HOSPITAL | Age: 64
End: 2024-01-26

## 2024-01-29 ENCOUNTER — APPOINTMENT (OUTPATIENT)
Dept: CARDIOLOGY | Facility: CLINIC | Age: 64
End: 2024-01-29

## 2024-02-05 ENCOUNTER — APPOINTMENT (OUTPATIENT)
Dept: CARDIOLOGY | Facility: CLINIC | Age: 64
End: 2024-02-05

## 2024-02-12 ENCOUNTER — APPOINTMENT (OUTPATIENT)
Dept: ORTHOPEDIC SURGERY | Facility: CLINIC | Age: 64
End: 2024-02-12

## 2024-02-20 ENCOUNTER — RX RENEWAL (OUTPATIENT)
Age: 64
End: 2024-02-20

## 2024-02-20 RX ORDER — NICOTINE 21 MG/24HR
21 PATCH, TRANSDERMAL 24 HOURS TRANSDERMAL DAILY
Qty: 28 | Refills: 3 | Status: ACTIVE | COMMUNITY
Start: 2022-12-14 | End: 1900-01-01

## 2024-03-05 NOTE — H&P PST ADULT - PATIENT ON (OXYGEN DELIVERY METHOD)
Problem: Potential for Falls  Goal: Patient will remain free of falls  Description: INTERVENTIONS:  - Educate patient/family on patient safety including physical limitations  - Instruct patient to call for assistance with activity   - Consult OT/PT to assist with strengthening/mobility   - Keep Call bell within reach  - Keep bed low and locked with side rails adjusted as appropriate  - Keep care items and personal belongings within reach  - Initiate and maintain comfort rounds  - Make Fall Risk Sign visible to staff  - Offer Toileting every 2 Hours, in advance of need  - Initiate/Maintain alarm  - Obtain necessary fall risk management equipment:  - Apply yellow socks and bracelet for high fall risk patients  - Consider moving patient to room near nurses station  Outcome: Progressing      room air

## 2024-03-06 ENCOUNTER — APPOINTMENT (OUTPATIENT)
Dept: ORTHOPEDIC SURGERY | Facility: CLINIC | Age: 64
End: 2024-03-06

## 2024-05-23 ENCOUNTER — RX RENEWAL (OUTPATIENT)
Age: 64
End: 2024-05-23

## 2024-05-23 RX ORDER — VALSARTAN AND HYDROCHLOROTHIAZIDE 80; 12.5 MG/1; MG/1
80-12.5 TABLET, FILM COATED ORAL
Qty: 180 | Refills: 1 | Status: ACTIVE | COMMUNITY
Start: 2022-01-14 | End: 1900-01-01

## 2024-06-02 ENCOUNTER — RX RENEWAL (OUTPATIENT)
Age: 64
End: 2024-06-02

## 2024-06-02 RX ORDER — OMEPRAZOLE 40 MG/1
40 CAPSULE, DELAYED RELEASE ORAL
Qty: 30 | Refills: 5 | Status: ACTIVE | COMMUNITY
Start: 2023-09-24 | End: 1900-01-01

## 2024-06-10 ENCOUNTER — LABORATORY RESULT (OUTPATIENT)
Age: 64
End: 2024-06-10

## 2024-06-10 ENCOUNTER — APPOINTMENT (OUTPATIENT)
Dept: INTERNAL MEDICINE | Facility: CLINIC | Age: 64
End: 2024-06-10
Payer: MEDICAID

## 2024-06-10 VITALS
DIASTOLIC BLOOD PRESSURE: 63 MMHG | BODY MASS INDEX: 21.09 KG/M2 | WEIGHT: 119 LBS | SYSTOLIC BLOOD PRESSURE: 98 MMHG | HEART RATE: 76 BPM | OXYGEN SATURATION: 97 % | HEIGHT: 63 IN

## 2024-06-10 DIAGNOSIS — R13.19 OTHER DYSPHAGIA: ICD-10-CM

## 2024-06-10 DIAGNOSIS — R10.9 UNSPECIFIED ABDOMINAL PAIN: ICD-10-CM

## 2024-06-10 DIAGNOSIS — M79.662 PAIN IN LEFT LOWER LEG: ICD-10-CM

## 2024-06-10 DIAGNOSIS — F41.9 ANXIETY DISORDER, UNSPECIFIED: ICD-10-CM

## 2024-06-10 DIAGNOSIS — K43.9 VENTRAL HERNIA W/OUT OBSTRUCTION OR GANGRENE: ICD-10-CM

## 2024-06-10 DIAGNOSIS — E05.90 THYROTOXICOSIS, UNSPECIFIED W/OUT THYROTOXIC CRISIS OR STORM: ICD-10-CM

## 2024-06-10 DIAGNOSIS — R11.10 VOMITING, UNSPECIFIED: ICD-10-CM

## 2024-06-10 DIAGNOSIS — D53.9 NUTRITIONAL ANEMIA, UNSPECIFIED: ICD-10-CM

## 2024-06-10 DIAGNOSIS — F32.A ANXIETY DISORDER, UNSPECIFIED: ICD-10-CM

## 2024-06-10 DIAGNOSIS — I73.9 PERIPHERAL VASCULAR DISEASE, UNSPECIFIED: ICD-10-CM

## 2024-06-10 DIAGNOSIS — R63.4 ABNORMAL WEIGHT LOSS: ICD-10-CM

## 2024-06-10 PROCEDURE — G2211 COMPLEX E/M VISIT ADD ON: CPT | Mod: NC

## 2024-06-10 PROCEDURE — 99214 OFFICE O/P EST MOD 30 MIN: CPT

## 2024-06-10 NOTE — PHYSICAL EXAM
[TextEntry] : General: alert, awake, oriented  X 3,  Ears: Ear canal  is  clear b/l , no discharge. Tympanic membrane is   intact b/l Nose: Mucosa normal, no obstruction.  Throat: Clear, no exudates, no lesions.  Neck: Supple, no masses,  Chest: Lungs are  clear b/l , no rales, no rhonchi, no wheezes.  Heart: RR, no murmurs, no rubs, no gallops.  Abdomen: Soft, no tenderness in all quadrant , no masses, BS normal.  Extremities: FROM, no deformities, no edema, no erythema.  Neuro: Physiological, no localizing findings.  Skin: Itchy rash  and pain at the sacral area started yesterday

## 2024-06-10 NOTE — HISTORY OF PRESENT ILLNESS
[FreeTextEntry1] : Patient is here for a follow up  [de-identified] : Patient is 63-year-old female with PMH of HTN, Hyperlipidemia, Disk herniation, Bipolar, h/o Opioid dependents in the past, CAD- s/p MI ,  came today with c/o feeling very tired, feels sleepy . Patient with increasing fatigue and unintentional weight loss over the last 4 months ass with chills  and sweat  C/o food intolerance lately, vomiting while eating  without feeling nauseous 3- 4 times per week, mostly after rice, pasta

## 2024-06-10 NOTE — ASSESSMENT
[FreeTextEntry1] : Patient c/o unexpected weight loss, chills, sweats and fatigue- referred to do lab work and CT abdomen/pelvic , referred to GI  for further work up Patient agreed and verbalized understanding of above

## 2024-06-10 NOTE — REVIEW OF SYSTEMS
[TextEntry] : Head: Denies headache, some days dizziness, c/o chills and sweating Eyes: No acute  vision problem  Ears: Denies hearing loss, tinnitus, no ear pain Nose: Denies nasal obstruction or any discharges Neck: Denies stiffness or muscle tenderness Chest: Denies cough, SOB CV: Denies chest pain, palpitation Abdominal: C/o  abdominal pain, change in bowel movement ass with stool incontinence Neurology: Denies  changes in mental status, seizure, no neurological deficit

## 2024-06-17 LAB
ALBUMIN SERPL ELPH-MCNC: 4.5 G/DL
ALP BLD-CCNC: 204 U/L
ALT SERPL-CCNC: 47 U/L
ANION GAP SERPL CALC-SCNC: 11 MMOL/L
APPEARANCE: CLEAR
AST SERPL-CCNC: 28 U/L
BILIRUB SERPL-MCNC: 0.2 MG/DL
BILIRUBIN URINE: NEGATIVE
BLOOD URINE: NEGATIVE
BUN SERPL-MCNC: 23 MG/DL
CALCIUM SERPL-MCNC: 9.1 MG/DL
CHLORIDE SERPL-SCNC: 101 MMOL/L
CO2 SERPL-SCNC: 24 MMOL/L
COLOR: NORMAL
CREAT SERPL-MCNC: 0.86 MG/DL
EGFR: 76 ML/MIN/1.73M2
ESTIMATED AVERAGE GLUCOSE: 105 MG/DL
FOLATE SERPL-MCNC: 18.6 NG/ML
GLUCOSE QUALITATIVE U: NEGATIVE MG/DL
GLUCOSE SERPL-MCNC: 96 MG/DL
HBA1C MFR BLD HPLC: 5.3 %
HCT VFR BLD CALC: 34.4 %
HGB BLD-MCNC: 11 G/DL
IRON SERPL-MCNC: 45 UG/DL
KETONES URINE: NEGATIVE MG/DL
LEUKOCYTE ESTERASE URINE: ABNORMAL
MCHC RBC-ENTMCNC: 30.6 PG
MCHC RBC-ENTMCNC: 32 GM/DL
MCV RBC AUTO: 95.8 FL
NITRITE URINE: NEGATIVE
PH URINE: 6.5
PLATELET # BLD AUTO: 290 K/UL
POTASSIUM SERPL-SCNC: 5.1 MMOL/L
PROT SERPL-MCNC: 7.1 G/DL
PROTEIN URINE: NORMAL MG/DL
RBC # BLD: 3.59 M/UL
RBC # FLD: 13.5 %
SODIUM SERPL-SCNC: 137 MMOL/L
SPECIFIC GRAVITY URINE: >1.03
TSH SERPL-ACNC: 0.55 UIU/ML
UROBILINOGEN URINE: 1 MG/DL
VIT B12 SERPL-MCNC: >2000 PG/ML
WBC # FLD AUTO: 6.02 K/UL

## 2024-06-26 ENCOUNTER — APPOINTMENT (OUTPATIENT)
Dept: VASCULAR SURGERY | Facility: CLINIC | Age: 64
End: 2024-06-26

## 2024-07-10 ENCOUNTER — APPOINTMENT (OUTPATIENT)
Dept: GASTROENTEROLOGY | Facility: CLINIC | Age: 64
End: 2024-07-10

## 2024-08-05 ENCOUNTER — APPOINTMENT (OUTPATIENT)
Dept: INTERNAL MEDICINE | Facility: CLINIC | Age: 64
End: 2024-08-05

## 2024-08-05 PROBLEM — U07.1 COVID-19: Status: ACTIVE | Noted: 2024-08-05

## 2024-08-05 PROCEDURE — 99442: CPT | Mod: 93

## 2024-08-27 ENCOUNTER — RX RENEWAL (OUTPATIENT)
Age: 64
End: 2024-08-27

## 2024-08-31 NOTE — ED PROVIDER NOTE - TEMPLATE, MLM
Cardiac c/o of difficulty breathing, cough, and chest pain. Oxygen was below 85% room air. Report was having sexual intercourse earlier and started coughing and SOB. Hx of CHF, asthma stent x 1. Pt took asthma meds Symbicort, and steriod.

## 2024-11-12 ENCOUNTER — APPOINTMENT (OUTPATIENT)
Dept: INTERNAL MEDICINE | Facility: CLINIC | Age: 64
End: 2024-11-12
Payer: MEDICAID

## 2024-11-12 VITALS
SYSTOLIC BLOOD PRESSURE: 99 MMHG | BODY MASS INDEX: 20.91 KG/M2 | HEIGHT: 63 IN | OXYGEN SATURATION: 97 % | DIASTOLIC BLOOD PRESSURE: 65 MMHG | TEMPERATURE: 98 F | WEIGHT: 118 LBS | HEART RATE: 87 BPM

## 2024-11-12 DIAGNOSIS — I21.9 ACUTE MYOCARDIAL INFARCTION, UNSPECIFIED: ICD-10-CM

## 2024-11-12 DIAGNOSIS — R63.4 ABNORMAL WEIGHT LOSS: ICD-10-CM

## 2024-11-12 DIAGNOSIS — F17.200 NICOTINE DEPENDENCE, UNSPECIFIED, UNCOMPLICATED: ICD-10-CM

## 2024-11-12 DIAGNOSIS — Z12.31 ENCOUNTER FOR SCREENING MAMMOGRAM FOR MALIGNANT NEOPLASM OF BREAST: ICD-10-CM

## 2024-11-12 DIAGNOSIS — R22.1 LOCALIZED SWELLING, MASS AND LUMP, NECK: ICD-10-CM

## 2024-11-12 PROCEDURE — 99214 OFFICE O/P EST MOD 30 MIN: CPT | Mod: 25

## 2024-11-12 RX ORDER — VARENICLINE TARTRATE 0.5 (11)-1
0.5 MG X 11 & KIT ORAL
Qty: 1 | Refills: 0 | Status: ACTIVE | COMMUNITY
Start: 2024-11-12 | End: 1900-01-01

## 2024-11-19 LAB
ALBUMIN SERPL ELPH-MCNC: 4.7 G/DL
ALP BLD-CCNC: 179 U/L
ALT SERPL-CCNC: 27 U/L
ANION GAP SERPL CALC-SCNC: 16 MMOL/L
AST SERPL-CCNC: 27 U/L
BILIRUB SERPL-MCNC: <0.2 MG/DL
BUN SERPL-MCNC: 30 MG/DL
CALCIUM SERPL-MCNC: 9.8 MG/DL
CHLORIDE SERPL-SCNC: 103 MMOL/L
CO2 SERPL-SCNC: 22 MMOL/L
CREAT SERPL-MCNC: 1.23 MG/DL
EGFR: 49 ML/MIN/1.73M2
GLUCOSE SERPL-MCNC: 79 MG/DL
HCT VFR BLD CALC: 35.8 %
HCV AB SER QL: NONREACTIVE
HCV S/CO RATIO: 0.08 S/CO
HGB BLD-MCNC: 11.4 G/DL
HIV1+2 AB SPEC QL IA.RAPID: NONREACTIVE
M TB IFN-G BLD-IMP: NEGATIVE
MCHC RBC-ENTMCNC: 30.3 PG
MCHC RBC-ENTMCNC: 31.8 G/DL
MCV RBC AUTO: 95.2 FL
PLATELET # BLD AUTO: 296 K/UL
POTASSIUM SERPL-SCNC: 4.7 MMOL/L
PROT SERPL-MCNC: 7.6 G/DL
QUANTIFERON TB PLUS MITOGEN MINUS NIL: 6.92 IU/ML
QUANTIFERON TB PLUS NIL: 0.03 IU/ML
QUANTIFERON TB PLUS TB1 MINUS NIL: 0 IU/ML
QUANTIFERON TB PLUS TB2 MINUS NIL: 0 IU/ML
RBC # BLD: 3.76 M/UL
RBC # FLD: 14.2 %
SODIUM SERPL-SCNC: 141 MMOL/L
TSH SERPL-ACNC: 0.94 UIU/ML
WBC # FLD AUTO: 7.88 K/UL

## 2024-11-25 ENCOUNTER — RX RENEWAL (OUTPATIENT)
Age: 64
End: 2024-11-25

## 2024-11-27 ENCOUNTER — NON-APPOINTMENT (OUTPATIENT)
Age: 64
End: 2024-11-27

## 2024-12-02 DIAGNOSIS — R22.1 LOCALIZED SWELLING, MASS AND LUMP, NECK: ICD-10-CM

## 2024-12-02 DIAGNOSIS — R63.4 ABNORMAL WEIGHT LOSS: ICD-10-CM

## 2025-01-08 ENCOUNTER — NON-APPOINTMENT (OUTPATIENT)
Age: 65
End: 2025-01-08

## 2025-03-20 ENCOUNTER — RX RENEWAL (OUTPATIENT)
Age: 65
End: 2025-03-20

## 2025-04-05 ENCOUNTER — NON-APPOINTMENT (OUTPATIENT)
Age: 65
End: 2025-04-05

## 2025-04-11 ENCOUNTER — APPOINTMENT (OUTPATIENT)
Dept: CARDIOLOGY | Facility: CLINIC | Age: 65
End: 2025-04-11
Payer: MEDICAID

## 2025-04-11 ENCOUNTER — NON-APPOINTMENT (OUTPATIENT)
Age: 65
End: 2025-04-11

## 2025-04-11 VITALS
HEIGHT: 63 IN | WEIGHT: 122 LBS | DIASTOLIC BLOOD PRESSURE: 60 MMHG | BODY MASS INDEX: 21.62 KG/M2 | SYSTOLIC BLOOD PRESSURE: 98 MMHG | HEART RATE: 72 BPM

## 2025-04-11 DIAGNOSIS — I25.10 ATHEROSCLEROTIC HEART DISEASE OF NATIVE CORONARY ARTERY W/OUT ANGINA PECTORIS: ICD-10-CM

## 2025-04-11 DIAGNOSIS — I36.1 NONRHEUMATIC TRICUSPID (VALVE) INSUFFICIENCY: ICD-10-CM

## 2025-04-11 DIAGNOSIS — Z87.891 PERSONAL HISTORY OF NICOTINE DEPENDENCE: ICD-10-CM

## 2025-04-11 DIAGNOSIS — I10 ESSENTIAL (PRIMARY) HYPERTENSION: ICD-10-CM

## 2025-04-11 DIAGNOSIS — E78.5 HYPERLIPIDEMIA, UNSPECIFIED: ICD-10-CM

## 2025-04-11 DIAGNOSIS — I70.203 UNSPECIFIED ATHEROSCLEROSIS OF NATIVE ARTERIES OF EXTREMITIES, BILATERAL LEGS: ICD-10-CM

## 2025-04-11 DIAGNOSIS — I34.0 NONRHEUMATIC MITRAL (VALVE) INSUFFICIENCY: ICD-10-CM

## 2025-04-11 PROCEDURE — 99214 OFFICE O/P EST MOD 30 MIN: CPT | Mod: 25

## 2025-04-11 PROCEDURE — 93000 ELECTROCARDIOGRAM COMPLETE: CPT

## 2025-04-11 RX ORDER — VALSARTAN 160 MG/1
160 TABLET, COATED ORAL DAILY
Qty: 90 | Refills: 2 | Status: ACTIVE | COMMUNITY
Start: 2025-04-11 | End: 1900-01-01

## 2025-05-28 NOTE — ED ADULT NURSE NOTE - CAS DISCH CONDITION
Orientation to room/Bed in low position, brakes on/Assess eliminations need, assist as needed Stable

## 2025-06-21 NOTE — ED ADULT NURSE NOTE - BRAND OF COVID-19 VACCINATION
Pfizer dose 1 and 2 You can access the FollowMyHealth Patient Portal offered by [facility] by registering at the following website: [email or web address]. By joining [facility]’s FollowMyHealth portal, you will also be able to view your health information using other applications (apps) compatible with our system.

## 2025-09-16 ENCOUNTER — APPOINTMENT (OUTPATIENT)
Dept: INTERNAL MEDICINE | Facility: CLINIC | Age: 65
End: 2025-09-16

## 2025-09-17 ENCOUNTER — RX RENEWAL (OUTPATIENT)
Age: 65
End: 2025-09-17

## 2025-09-17 ENCOUNTER — APPOINTMENT (OUTPATIENT)
Dept: CARDIOLOGY | Facility: CLINIC | Age: 65
End: 2025-09-17

## (undated) DEVICE — ZIMMER PULSAVAC PLUS FAN KIT

## (undated) DEVICE — SAW BLADE ZIMMER RECIPROCATING SINGLE SIDED 10X70X1.19MM

## (undated) DEVICE — ELCTR GROUNDING PAD ADULT COVIDIEN

## (undated) DEVICE — DRAPE SPLIT SHEET 77" X 108"

## (undated) DEVICE — NDL HYPO SAFE 20G X 1.5" (YELLOW)

## (undated) DEVICE — GLV 8 PROTEXIS ORTHO (BROWN)

## (undated) DEVICE — SUT VICRYL 0 36" CT-1 UNDYED

## (undated) DEVICE — SYR LUER LOK 50CC

## (undated) DEVICE — SUT STRATAFIX SPIRAL MONOCRYL PLUS 3-0 30CM PS-2 UNDYED

## (undated) DEVICE — SUT STRATAFIX SPIRAL PDO 2-0 24CM MH

## (undated) DEVICE — SOL IRR BAG NS 0.9% 3000ML

## (undated) DEVICE — ELCTR AQUAMANTYS BIPOLAR SEALER 6.0

## (undated) DEVICE — DRSG DERMABOND PRINEO 60CM

## (undated) DEVICE — POSITIONER FOAM EGG CRATE ULNAR 2PCS (PINK)

## (undated) DEVICE — VENODYNE/SCD SLEEVE CALF MEDIUM

## (undated) DEVICE — SOL IRR POUR NS 0.9% 1000ML

## (undated) DEVICE — SUT ETHIBOND 5 4-30" CCS

## (undated) DEVICE — SUT HEWSON RETRIEVER

## (undated) DEVICE — ELCTR STRYKER NEPTUNE SMOKE EVACUATION PENCIL (GREEN)

## (undated) DEVICE — SOL IRR POUR H2O 1000ML

## (undated) DEVICE — HOOD T7 NON-PEELAWAY

## (undated) DEVICE — DRAPE STICKY U BLUE 60 X 84"

## (undated) DEVICE — DRAPE HIP W POUCHES 87X115X134"

## (undated) DEVICE — SOL INJ NS 0.9% 250ML

## (undated) DEVICE — DRILL BIT BRASSELER TWIST 2.4MM 3/32 X 5"

## (undated) DEVICE — DRSG MEPILEX 10 X 25CM (4 X 10") POST OP AG SILVER

## (undated) DEVICE — SUT VICRYL 2-0 27" CT-2 UNDYED

## (undated) DEVICE — HOOD FLYTE STRYKER SURGICOOL W PEELAWAY

## (undated) DEVICE — TAPE SILK 3"

## (undated) DEVICE — WARMING BLANKET UPPER ADULT

## (undated) DEVICE — PACK TOTAL HIP

## (undated) DEVICE — DRAPE XL SHEET 77X98"

## (undated) DEVICE — DRAPE IOBAN 33" X 23"

## (undated) DEVICE — GLV 8 PROTEXIS (BLUE)

## (undated) DEVICE — WOUND IRR SURGIPHOR